# Patient Record
Sex: FEMALE | Race: WHITE | NOT HISPANIC OR LATINO | Employment: FULL TIME | URBAN - METROPOLITAN AREA
[De-identification: names, ages, dates, MRNs, and addresses within clinical notes are randomized per-mention and may not be internally consistent; named-entity substitution may affect disease eponyms.]

---

## 2018-02-08 ENCOUNTER — OFFICE VISIT (OUTPATIENT)
Dept: OBSTETRICS AND GYNECOLOGY | Facility: CLINIC | Age: 28
End: 2018-02-08

## 2018-02-08 VITALS
BODY MASS INDEX: 34.36 KG/M2 | SYSTOLIC BLOOD PRESSURE: 122 MMHG | DIASTOLIC BLOOD PRESSURE: 81 MMHG | HEIGHT: 68 IN | WEIGHT: 226.7 LBS

## 2018-02-08 DIAGNOSIS — Z01.419 PAP SMEAR, LOW-RISK: ICD-10-CM

## 2018-02-08 DIAGNOSIS — L68.0 HIRSUTISM: ICD-10-CM

## 2018-02-08 DIAGNOSIS — Z11.51 SPECIAL SCREENING EXAMINATION FOR HUMAN PAPILLOMAVIRUS (HPV): ICD-10-CM

## 2018-02-08 DIAGNOSIS — Z13.9 SCREENING FOR CONDITION: ICD-10-CM

## 2018-02-08 DIAGNOSIS — N91.3 PRIMARY OLIGOMENORRHEA: ICD-10-CM

## 2018-02-08 DIAGNOSIS — E28.2 PCOS (POLYCYSTIC OVARIAN SYNDROME): ICD-10-CM

## 2018-02-08 DIAGNOSIS — Z01.419 ENCOUNTER FOR GYNECOLOGICAL EXAMINATION WITHOUT ABNORMAL FINDING: Primary | ICD-10-CM

## 2018-02-08 LAB
B-HCG UR QL: NEGATIVE
BILIRUB BLD-MCNC: NEGATIVE MG/DL
CLARITY, POC: CLEAR
COLOR UR: YELLOW
GLUCOSE UR STRIP-MCNC: NEGATIVE MG/DL
INTERNAL NEGATIVE CONTROL: NEGATIVE
INTERNAL POSITIVE CONTROL: POSITIVE
KETONES UR QL: NEGATIVE
LEUKOCYTE EST, POC: NEGATIVE
Lab: NORMAL
NITRITE UR-MCNC: NEGATIVE MG/ML
PH UR: 5 [PH] (ref 5–8)
PROT UR STRIP-MCNC: NEGATIVE MG/DL
RBC # UR STRIP: NEGATIVE /UL
SP GR UR: 1 (ref 1–1.03)
UROBILINOGEN UR QL: NORMAL

## 2018-02-08 PROCEDURE — 99395 PREV VISIT EST AGE 18-39: CPT | Performed by: OBSTETRICS & GYNECOLOGY

## 2018-02-08 PROCEDURE — 81025 URINE PREGNANCY TEST: CPT | Performed by: OBSTETRICS & GYNECOLOGY

## 2018-02-08 PROCEDURE — 99213 OFFICE O/P EST LOW 20 MIN: CPT | Performed by: OBSTETRICS & GYNECOLOGY

## 2018-02-08 RX ORDER — LANCETS 33 GAUGE
EACH MISCELLANEOUS
COMMUNITY
Start: 2018-01-23 | End: 2022-06-08

## 2018-02-08 RX ORDER — BLOOD-GLUCOSE METER
KIT MISCELLANEOUS
COMMUNITY
Start: 2018-01-23 | End: 2022-06-08

## 2018-02-08 RX ORDER — DROSPIRENONE AND ETHINYL ESTRADIOL 0.02-3(28)
1 KIT ORAL DAILY
Qty: 84 TABLET | Refills: 3 | Status: SHIPPED | OUTPATIENT
Start: 2018-02-08 | End: 2022-06-08

## 2018-02-08 NOTE — PROGRESS NOTES
GYN Annual Exam     CC- Here for annual exam.     Oralia Lawrence is a 27 y.o. female who presents for annual well woman exam. Periods are irregular without OCPs. Has skipped 3 months in the past., lasting 5 days. Dysmenorrhea:none. Cyclic symptoms include none. No intermenstrual bleeding, spotting, or discharge.  Patient is sexually active  yes -  . Patient is satisfied with her contraception yes - BTL. Pt with hx of PCOS and is on Metformin. Her primary care physician has her checking her BS for possible DM diagnosis.    OB History     No data available          Current contraception: tubal ligation  History of abnormal Pap smear: no  History of abnormal mammogram: no  Family history of uterine, colon or ovarian cancer: no  Family history of breast cancer: no    Health Maintenance   Topic Date Due   • TDAP/TD VACCINES (1 - Tdap) 10/12/2007   • INFLUENZA VACCINE  2017   • PAP SMEAR  2021       Past Medical History:   Diagnosis Date   • Chronic back pain    • Polycystic ovary syndrome        Past Surgical History:   Procedure Laterality Date   •  SECTION  ,          Current Outpatient Prescriptions:   •  Blood Glucose Monitoring Suppl (ONE TOUCH ULTRA MINI) w/Device kit, , Disp: , Rfl:   •  drospirenone-ethinyl estradiol (JORJE,GIANVI) 3-0.02 MG per tablet, Take 1 tablet by mouth Daily., Disp: 84 tablet, Rfl: 3  •  metFORMIN (GLUCOPHAGE) 500 MG tablet, Take 500 mg by mouth., Disp: , Rfl:   •  naproxen (NAPROSYN) 500 MG tablet, Take 500 mg by mouth 2 (two) times a day with meals., Disp: , Rfl:   •  norethindrone-ethinyl estradiol-iron (ESTROSTEP FE) 1-20/1-30/1-35 MG-MCG tablet, Take 1 tablet by mouth daily., Disp: , Rfl:   •  ONE TOUCH ULTRA TEST test strip, , Disp: , Rfl:   •  ONETOUCH DELICA LANCETS 33G misc, , Disp: , Rfl:     Allergies   Allergen Reactions   • Motrin [Ibuprofen]    • Sulfa Antibiotics        Social History   Substance Use Topics   • Smoking status: Never  "Smoker   • Smokeless tobacco: None   • Alcohol use No       History reviewed. No pertinent family history.    Review of Systems   Constitutional: Negative for activity change, appetite change and unexpected weight change.   Gastrointestinal: Negative for abdominal distention and abdominal pain.   Genitourinary: Negative for menstrual problem, pelvic pain, vaginal bleeding and vaginal discharge.       /81  Ht 172.7 cm (67.99\")  Wt 103 kg (226 lb 11.2 oz)  LMP 02/01/2018 (Exact Date)  Breastfeeding? No  BMI 34.48 kg/m2    Physical Exam   Constitutional: She is oriented to person, place, and time. She appears well-developed and well-nourished.   HENT:   Mouth/Throat: Normal dentition. No dental caries.   Cardiovascular: Normal rate and regular rhythm.    Pulmonary/Chest: Effort normal and breath sounds normal. Right breast exhibits no inverted nipple, no mass, no nipple discharge, no skin change and no tenderness. Left breast exhibits no inverted nipple, no mass, no nipple discharge, no skin change and no tenderness.   Abdominal: Soft. She exhibits no distension and no mass. There is no tenderness.   Genitourinary: There is no rash, tenderness or lesion on the right labia. There is no rash, tenderness or lesion on the left labia. Uterus is not deviated, not enlarged, not fixed and not tender. Cervix exhibits no motion tenderness, no discharge and no friability. Right adnexum displays no mass, no tenderness and no fullness. Left adnexum displays no mass, no tenderness and no fullness. No tenderness or bleeding in the vagina. No vaginal discharge found.   Neurological: She is alert and oriented to person, place, and time.   Psychiatric: She has a normal mood and affect. Her behavior is normal. Judgment and thought content normal.   Vitals reviewed.         Assessment/Plan    1) GYN HM: check pap smear.  SBE demonstrated and encouraged.  2) PCOS: On Metformin 500mg po daily- discussed increasing to bid. Hx of " oligomenorrhea but has been getting cycle more regularly since she has lost some weight.  3) Contraception: BTL  4) Type 2DM? Pt was seen by primary care physician and started on a glucometer. Need lab results to see what labs were drawn by primary care physician.  5) Diet and Exercise discussed. Has lost 12 pounds   6) Smoking Status: nonsmoker  7) Hirsutism: Mostly of face and nipples. Will check testosterone level if it hasnt been checked by primary. Jorje may help some but pt may need Spironolactone.  8) Oligomenorrhea: Start Jorje to regulate cycles and protect endometrium.  8) Follow up prn and 1 year       Diagnoses and all orders for this visit:    Encounter for gynecological examination without abnormal finding    Screening for condition  -     POC Pregnancy, Urine  -     POC Urinalysis Dipstick    PCOS (polycystic ovarian syndrome)    Primary oligomenorrhea    Pap smear, low-risk  -     Pap IG, Rfx HPV ASCU - ThinPrep Vial, Cervix    Special screening examination for human papillomavirus (HPV)  -     Pap IG, Rfx HPV ASCU - ThinPrep Vial, Cervix    Hirsutism    Other orders  -     metFORMIN (GLUCOPHAGE) 500 MG tablet; Take 500 mg by mouth.  -     Blood Glucose Monitoring Suppl (ONE TOUCH ULTRA MINI) w/Device kit;   -     ONE TOUCH ULTRA TEST test strip;   -     ONETOUCH DELICA LANCETS 33G misc;   -     drospirenone-ethinyl estradiol (JORJE,GIANVI) 3-0.02 MG per tablet; Take 1 tablet by mouth Daily.          Monica Turner, DO  2/17/2018  4:25 PM

## 2018-02-12 LAB
CONV .: NORMAL
CYTOLOGIST CVX/VAG CYTO: NORMAL
CYTOLOGY CVX/VAG DOC THIN PREP: NORMAL
DX ICD CODE: NORMAL
HIV 1 & 2 AB SER-IMP: NORMAL
OTHER STN SPEC: NORMAL
PATH REPORT.FINAL DX SPEC: NORMAL
STAT OF ADQ CVX/VAG CYTO-IMP: NORMAL

## 2018-02-17 ENCOUNTER — TELEPHONE (OUTPATIENT)
Dept: OBSTETRICS AND GYNECOLOGY | Facility: CLINIC | Age: 28
End: 2018-02-17

## 2018-02-17 NOTE — TELEPHONE ENCOUNTER
I need the most recent labs that her primary care physician dariana so I know what labs to check on her.

## 2018-02-21 DIAGNOSIS — E28.2 PCOS (POLYCYSTIC OVARIAN SYNDROME): Primary | ICD-10-CM

## 2018-02-26 ENCOUNTER — RESULTS ENCOUNTER (OUTPATIENT)
Dept: OBSTETRICS AND GYNECOLOGY | Facility: CLINIC | Age: 28
End: 2018-02-26

## 2018-02-26 DIAGNOSIS — E28.2 PCOS (POLYCYSTIC OVARIAN SYNDROME): ICD-10-CM

## 2018-02-28 ENCOUNTER — LAB (OUTPATIENT)
Dept: OBSTETRICS AND GYNECOLOGY | Facility: CLINIC | Age: 28
End: 2018-02-28

## 2018-02-28 DIAGNOSIS — E28.2 PCOS (POLYCYSTIC OVARIAN SYNDROME): Primary | ICD-10-CM

## 2018-07-10 ENCOUNTER — HOSPITAL ENCOUNTER (EMERGENCY)
Facility: HOSPITAL | Age: 28
Discharge: LEFT BEFORE DISCHARGE | End: 2018-07-10

## 2018-10-21 ENCOUNTER — OFFICE VISIT (OUTPATIENT)
Dept: RETAIL CLINIC | Facility: CLINIC | Age: 28
End: 2018-10-21

## 2018-10-21 VITALS
DIASTOLIC BLOOD PRESSURE: 88 MMHG | TEMPERATURE: 97.7 F | HEART RATE: 71 BPM | SYSTOLIC BLOOD PRESSURE: 124 MMHG | BODY MASS INDEX: 32.58 KG/M2 | RESPIRATION RATE: 16 BRPM | OXYGEN SATURATION: 98 % | HEIGHT: 68 IN | WEIGHT: 215 LBS

## 2018-10-21 DIAGNOSIS — R05.9 COUGH: ICD-10-CM

## 2018-10-21 DIAGNOSIS — J06.9 ACUTE URI: Primary | ICD-10-CM

## 2018-10-21 PROCEDURE — 99213 OFFICE O/P EST LOW 20 MIN: CPT | Performed by: NURSE PRACTITIONER

## 2018-10-21 RX ORDER — DEXTROMETHORPHAN HYDROBROMIDE AND PROMETHAZINE HYDROCHLORIDE 15; 6.25 MG/5ML; MG/5ML
5 SYRUP ORAL 4 TIMES DAILY PRN
Qty: 118 ML | Refills: 0 | Status: SHIPPED | OUTPATIENT
Start: 2018-10-21 | End: 2018-10-28

## 2018-10-21 RX ORDER — AMOXICILLIN 500 MG/1
500 CAPSULE ORAL 2 TIMES DAILY
Qty: 20 CAPSULE | Refills: 0 | Status: SHIPPED | OUTPATIENT
Start: 2018-10-21 | End: 2018-10-31

## 2018-10-21 NOTE — PROGRESS NOTES
"Subjective   Oralia Lawrence is a 28 y.o. female.     /88 (BP Location: Left arm, Patient Position: Sitting, Cuff Size: Adult)   Pulse 71   Temp 97.7 °F (36.5 °C) (Oral)   Resp 16   Ht 172.7 cm (68\")   Wt 97.5 kg (215 lb)   LMP 10/15/2018   SpO2 98%   BMI 32.69 kg/m²     URI    This is a new problem. The current episode started 1 to 4 weeks ago. The problem has been gradually worsening. There has been no fever. Associated symptoms include congestion, coughing, headaches and rhinorrhea. Pertinent negatives include no ear pain, nausea, rash, sinus pain, sneezing, sore throat, swollen glands, vomiting or wheezing. She has tried nothing (pt was seen at [facility] 2 weeks ago, was given cough med and steroid, but no improvement in condition, pt son is ill at this time ) for the symptoms. The treatment provided no relief.        The following portions of the patient's history were reviewed and updated as appropriate: current medications, past family history, past medical history, past social history, past surgical history and problem list.    Review of Systems   Constitutional: Positive for chills and fatigue. Negative for fever.   HENT: Positive for congestion and rhinorrhea. Negative for ear pain, sneezing and sore throat.    Eyes: Negative.    Respiratory: Positive for cough and choking. Negative for wheezing.    Cardiovascular: Negative.    Gastrointestinal: Negative.  Negative for nausea and vomiting.   Endocrine: Negative.    Genitourinary: Negative.    Skin: Negative for rash.   Allergic/Immunologic: Negative.    Neurological: Positive for headache.   Hematological: Negative.    Psychiatric/Behavioral: Negative.        Objective   Physical Exam   Constitutional: She is oriented to person, place, and time. She appears well-developed and well-nourished.   HENT:   Head: Normocephalic and atraumatic.   Right Ear: Hearing, tympanic membrane, external ear and ear canal normal.   Left Ear: Hearing, tympanic " membrane, external ear and ear canal normal.   Nose: Rhinorrhea and congestion present.   Mouth/Throat: Uvula is midline. Posterior oropharyngeal erythema present.   Eyes: Pupils are equal, round, and reactive to light. Conjunctivae and EOM are normal.   Neck: Normal range of motion.   Cardiovascular: Normal rate, regular rhythm and normal heart sounds.    Pulmonary/Chest: Effort normal and breath sounds normal.   Abdominal: Soft. Bowel sounds are normal.   Musculoskeletal: Normal range of motion.   Neurological: She is alert and oriented to person, place, and time.   Skin: Skin is warm and dry.        Psychiatric: She has a normal mood and affect.         Assessment/Plan   Oralia was seen today for uri.    Diagnoses and all orders for this visit:    Acute URI  -     amoxicillin (AMOXIL) 500 MG capsule; Take 1 capsule by mouth 2 (Two) Times a Day for 10 days.    Cough  -     promethazine-dextromethorphan (PROMETHAZINE-DM) 6.25-15 MG/5ML syrup; Take 5 mL by mouth 4 (Four) Times a Day As Needed for Cough for up to 7 days.

## 2018-10-22 ENCOUNTER — TELEPHONE (OUTPATIENT)
Dept: OBSTETRICS AND GYNECOLOGY | Facility: CLINIC | Age: 28
End: 2018-10-22

## 2018-10-22 DIAGNOSIS — E28.2 PCOS (POLYCYSTIC OVARIAN SYNDROME): Primary | ICD-10-CM

## 2018-10-22 NOTE — TELEPHONE ENCOUNTER
Patient saw you in Feb. She was to come back and have labs drawn. She just got her Insurance back and is wanting to come and get those labs drawn. I saw fasting labs, but not sure what labs are needed. Please advise

## 2018-10-25 LAB
INSULIN SERPL-ACNC: 21.6 UIU/ML (ref 2.6–24.9)
TESTOST FREE SERPL-MCNC: 3.3 PG/ML (ref 0–4.2)
TESTOST SERPL-MCNC: 38 NG/DL (ref 8–48)

## 2022-02-08 ENCOUNTER — HOSPITAL ENCOUNTER (EMERGENCY)
Facility: HOSPITAL | Age: 32
Discharge: HOME | End: 2022-02-08
Attending: EMERGENCY MEDICINE | Admitting: EMERGENCY MEDICINE

## 2022-02-08 ENCOUNTER — APPOINTMENT (OUTPATIENT)
Dept: ULTRASOUND IMAGING | Facility: HOSPITAL | Age: 32
End: 2022-02-08

## 2022-02-08 VITALS
OXYGEN SATURATION: 100 % | WEIGHT: 230 LBS | RESPIRATION RATE: 15 BRPM | HEART RATE: 74 BPM | HEIGHT: 64 IN | BODY MASS INDEX: 39.27 KG/M2 | SYSTOLIC BLOOD PRESSURE: 126 MMHG | TEMPERATURE: 97.7 F | DIASTOLIC BLOOD PRESSURE: 77 MMHG

## 2022-02-08 DIAGNOSIS — M79.605 LEFT LEG PAIN: Primary | ICD-10-CM

## 2022-02-08 PROCEDURE — 99282 EMERGENCY DEPT VISIT SF MDM: CPT | Performed by: EMERGENCY MEDICINE

## 2022-02-08 PROCEDURE — 99282 EMERGENCY DEPT VISIT SF MDM: CPT

## 2022-02-08 PROCEDURE — 93971 EXTREMITY STUDY: CPT

## 2022-02-08 NOTE — ED PROVIDER NOTES
Subjective   History of Present Illness  31-year-old female presents to the emergency room complaining of left leg pain she describes it as intermittent over the last week.  She says it seems to go back and forth between her hamstring area and her calf.  She does not think is been swollen compared to the other side and she has not noticed any redness.  She says it feels like a tightness like it is stretching more than usual.  Has not seen her doctor about this.  Denies any chest pain denies any difficulty breathing.  She denies taking any birth control pills and denies any history of DVT.  No injury to the leg.  Review of Systems   Musculoskeletal:        As noted in HPI       Past Medical History:   Diagnosis Date   • Chronic back pain    • PCOS (polycystic ovarian syndrome)    • Polycystic ovary syndrome        Allergies   Allergen Reactions   • Motrin [Ibuprofen] Hives   • Sulfa Antibiotics Unknown - Low Severity       Past Surgical History:   Procedure Laterality Date   •  SECTION  ,        History reviewed. No pertinent family history.    Social History     Socioeconomic History   • Marital status:    Tobacco Use   • Smoking status: Never Smoker   Substance and Sexual Activity   • Alcohol use: No   • Drug use: No           Objective   Physical Exam  INITIAL VITAL SIGNS: Reviewed by me.  Pulse ox normal  GENERAL: Alert. Well developed and well nourished. No respiratory distress.  RESPIRATORY: Non-labored respirations.  EXTREMITIES: No deformity.  Left leg is not swollen compared to the right leg, left leg has no redness to it.  She says she is mildly tender to palpation of the calf popliteal fossa and hamstring, with stretching the calf and hamstring she says that the pain is essentially reproduced.  SKIN: Warm and dry. No rashes. No diaphoresis.  NEUROLOGIC: Alert. Normal gait    US Venous Doppler Lower Extremity Left (duplex)    Result Date: 2022  VENOUS DOPPLER ULTRASOUND, LEFT  LOWER EXTREMITY, 02/08/2022  HISTORY: 31-year-old female in the ED complaining of left knee and posterior calf pain over the last 2-3 days.  TECHNIQUE: Venous Doppler ultrasound examination of the left leg was performed using grey-scale, spectral Doppler, and color flow Doppler ultrasound imaging.  FINDINGS: The examination is negative.  There is no evidence of deep venous thrombosis from the groin to the lower calf. The greater saphenous vein is also patent.  No cyst or other soft tissue abnormality is visible within the left popliteal space.      Negative examination.  No evidence of left lower extremity DVT.  This report was finalized on 2/8/2022 11:47 AM by Dr. Marcelo Snow MD.        Procedures           ED Course  ED Course as of 02/08/22 1216   Tue Feb 08, 2022   1158 Patient with what seems like muscular discomfort intermittently for the last week.  Says it feels somewhat stretching in nature and is reproduced on examination.  Ultrasound was obtained to rule out DVT which is negative.  I think she be safely discharged home. [RO]      ED Course User Index  [RO] Yakov Duran MD                                                 [facility]    Final diagnoses:   Left leg pain       ED Disposition  ED Disposition     ED Disposition Condition Comment    Discharge Andrea Rodgers MD  [address] [facility] [address] [phone]    Call   To schedule follow-up appointment         Medication List      No changes were made to your prescriptions during this visit.          Yakov Duran MD  02/08/22 1214

## 2022-02-08 NOTE — DISCHARGE INSTRUCTIONS
The ultrasound of your leg looking for blood clot was negative.  Please take Tylenol according to label instructions for discomfort.  Please gently stretch the calf muscle and hamstring muscle.  Please follow-up with your primary care doctor as needed.  Return to the emergency room if you develop chest pain, shortness of breath, severe abdominal pain or for any other concerns.

## 2024-02-17 ENCOUNTER — APPOINTMENT (OUTPATIENT)
Dept: ULTRASOUND IMAGING | Facility: HOSPITAL | Age: 34
End: 2024-02-17
Payer: COMMERCIAL

## 2024-02-17 ENCOUNTER — HOSPITAL ENCOUNTER (EMERGENCY)
Facility: HOSPITAL | Age: 34
Discharge: HOME | End: 2024-02-17
Attending: EMERGENCY MEDICINE
Payer: COMMERCIAL

## 2024-02-17 VITALS
TEMPERATURE: 98.9 F | WEIGHT: 238 LBS | HEIGHT: 67 IN | SYSTOLIC BLOOD PRESSURE: 149 MMHG | HEART RATE: 78 BPM | OXYGEN SATURATION: 97 % | RESPIRATION RATE: 18 BRPM | DIASTOLIC BLOOD PRESSURE: 91 MMHG | BODY MASS INDEX: 37.35 KG/M2

## 2024-02-17 DIAGNOSIS — M79.18 MUSCULOSKELETAL PAIN: Primary | ICD-10-CM

## 2024-02-17 PROCEDURE — 93971 EXTREMITY STUDY: CPT

## 2024-02-17 PROCEDURE — 99284 EMERGENCY DEPT VISIT MOD MDM: CPT

## 2024-02-17 RX ORDER — MELOXICAM 15 MG/1
15 TABLET ORAL DAILY
Qty: 7 TABLET | Refills: 0 | Status: SHIPPED | OUTPATIENT
Start: 2024-02-17

## 2024-02-17 NOTE — ED PROVIDER NOTES
Subjective   History of Present Illness  33-year-old female presents emergency room complaining of left leg pain.  Patient states that approximately 2 days ago she started having some pain in the back of her left mid thigh.  Patient reports no known injury or causative factor.  She states that since that time the pain has migrated now to the back of her left calf.  She is taken some over-the-counter medications with with minimal relief.  Patient reports claudication when she walks and she feels like her leg is mildly swollen.  Patient reports no shortness of breath chest pain neck pain jaw pain headache visual changes fever cough anorexia nausea vomiting dysuria              Review of Systems   Constitutional:  Negative for activity change, appetite change, chills, diaphoresis, fatigue and fever.   HENT:  Negative for congestion, rhinorrhea and sore throat.    Eyes:  Negative for photophobia and visual disturbance.   Respiratory:  Negative for cough and shortness of breath.    Cardiovascular:  Negative for chest pain, palpitations and leg swelling.   Gastrointestinal:  Negative for abdominal distention, abdominal pain, diarrhea, nausea and vomiting.   Genitourinary:  Negative for dysuria and flank pain.   Musculoskeletal:  Negative for arthralgias and back pain.        Left lower leg pain and swelling   Skin:  Negative for rash.   Neurological:  Negative for dizziness, weakness and headaches.   Psychiatric/Behavioral:  Negative for agitation and behavioral problems.        Past Medical History:   Diagnosis Date    Chronic back pain     PCOS (polycystic ovarian syndrome)     Polycystic ovary syndrome        Allergies   Allergen Reactions    Motrin [Ibuprofen] Hives    Sulfa Antibiotics Unknown - Low Severity       Past Surgical History:   Procedure Laterality Date     SECTION  ,        History reviewed. No pertinent family history.    Social History     Socioeconomic History    Marital status:     Tobacco Use    Smoking status: Never    Smokeless tobacco: Never   Vaping Use    Vaping Use: Never used   Substance and Sexual Activity    Alcohol use: No    Drug use: Defer    Sexual activity: Defer           Objective   Physical Exam  Vitals and nursing note reviewed.   Constitutional:       General: She is not in acute distress.     Appearance: Normal appearance. She is not ill-appearing, toxic-appearing or diaphoretic.   HENT:      Head: Normocephalic and atraumatic.      Nose: Nose normal.      Mouth/Throat:      Mouth: Mucous membranes are moist.   Eyes:      Conjunctiva/sclera: Conjunctivae normal.   Cardiovascular:      Rate and Rhythm: Normal rate and regular rhythm.      Pulses: Normal pulses.   Pulmonary:      Effort: Pulmonary effort is normal.      Breath sounds: Normal breath sounds.   Abdominal:      General: Abdomen is flat.   Musculoskeletal:         General: No swelling. Normal range of motion.      Cervical back: Normal range of motion and neck supple.        Legs:       Comments: Mild tenderness to palpation over left calf and area seen on diagram.  Left calf may be slightly larger than right but no pitting edema noted and no distal swelling noted.  Neurovascular intact in left lower extremity.   Skin:     General: Skin is warm and dry.   Neurological:      General: No focal deficit present.      Mental Status: She is alert and oriented to person, place, and time.   Psychiatric:         Mood and Affect: Mood normal.         Procedures           ED Course  ED Course as of 02/17/24 1159   Sat Feb 17, 2024   1139 Ultrasound venous Doppler left lower extremity:  IMPRESSION:  Impression:     1. No acute deep venous thrombosis of the left lower extremity           Electronically Signed: Saman Nicholson MD    2/17/2024 11:35 AM EST    Workstation ID: [id number]   []   1157 Spoke with patient concerning ultrasound results specifically no acute abnormality noted and no DVT noted.  I will treat patient  for musculoskeletal pain with anti-inflammatory medication and have her follow-up with primary care as needed and/or can return the emergency room for new persistent or worsening symptoms.  Patient states she understands agrees with plan. []   1158 Patient states she can take NSAIDs.  Patient states that she can take generic ibuprofen however she cannot take trade name Motrin []   1158 . []      ED Course User Index  [] Omega Giron MD                                             Medical Decision Making  My diagnosis for lower extremity pain and injury includes but is not limited to hip fracture, femur fracture, hip dislocation, hip contusion, hip sprain, hip strain, pelvic fracture, ischio-tibial band pain, ischio-tibial band bursitis, knee sprain, patella dislocation, knee dislocation, internal derangement of knee, fractures of the femur, tibia, fibula, ankle, foot and digits, ankle sprain, ankle dislocation, Lisfranc fracture, fracture dislocations of the digits, pulmonary embolism, claudication, peripheral vascular disease, gout, osteoarthritis, rheumatoid arthritis, bursitis, septic joint, poly-rheumatica, polyarthralgia and other inflammatory or infectious disease processes.         Final diagnoses:   Musculoskeletal pain       ED Disposition  ED Disposition       ED Disposition   Discharge    Condition   Stable    Comment   --               Provider, No Known  [place] [id number]  [phone]    Schedule an appointment as soon as possible for a visit       [facility] EMERGENCY DEPARTMENT  [address] [address] [phone]  Go to   As needed         Medication List        New Prescriptions      meloxicam 15 MG tablet  Commonly known as: MOBIC  Take 1 tablet by mouth Daily.               Where to Get Your Medications        These medications were sent to [id number] - [place] - [address] - [phone]   - [phone]  [address] [id number]      Phone: [phone]   meloxicam 15 MG tablet            Omega Giron MD  02/17/24 9322

## 2024-03-12 ENCOUNTER — OFFICE VISIT (OUTPATIENT)
Dept: INTERNAL MEDICINE | Facility: CLINIC | Age: 34
End: 2024-03-12
Payer: COMMERCIAL

## 2024-03-12 VITALS
SYSTOLIC BLOOD PRESSURE: 162 MMHG | BODY MASS INDEX: 37.48 KG/M2 | DIASTOLIC BLOOD PRESSURE: 108 MMHG | HEIGHT: 67 IN | HEART RATE: 75 BPM | OXYGEN SATURATION: 98 % | TEMPERATURE: 97.8 F | WEIGHT: 238.8 LBS

## 2024-03-12 DIAGNOSIS — E28.2 PCOS (POLYCYSTIC OVARIAN SYNDROME): Primary | ICD-10-CM

## 2024-03-12 DIAGNOSIS — Z13.1 SCREENING FOR DIABETES MELLITUS: ICD-10-CM

## 2024-03-12 DIAGNOSIS — E66.9 CLASS 2 OBESITY WITHOUT SERIOUS COMORBIDITY WITH BODY MASS INDEX (BMI) OF 37.0 TO 37.9 IN ADULT, UNSPECIFIED OBESITY TYPE: ICD-10-CM

## 2024-03-12 DIAGNOSIS — L68.0 HIRSUTISM: ICD-10-CM

## 2024-03-12 DIAGNOSIS — I10 PRIMARY HYPERTENSION: ICD-10-CM

## 2024-03-12 DIAGNOSIS — Z13.220 SCREENING FOR HYPERLIPIDEMIA: ICD-10-CM

## 2024-03-12 RX ORDER — AMLODIPINE BESYLATE 5 MG/1
5 TABLET ORAL DAILY
Qty: 90 TABLET | Refills: 0 | Status: SHIPPED | OUTPATIENT
Start: 2024-03-12

## 2024-03-12 RX ORDER — SPIRONOLACTONE 50 MG/1
50 TABLET, FILM COATED ORAL DAILY
Qty: 90 TABLET | Refills: 1 | Status: SHIPPED | OUTPATIENT
Start: 2024-03-12

## 2024-03-12 NOTE — PROGRESS NOTES
"Chief Complaint  Establish Care, Polycystic Ovary Syndrome, and Stress (Has moments where she feels like her heart is beating out of her chest)    Subjective        Oralia Lawrence presents to [facility]  History of Present Illness    Ms. Lawrence is a rebecca 32 yo F who presents to establish care and discuss HTN, PCOS.     Most bothered by hair growth and weight concerns with PCOS.  Period is now regular (it was not previously).       Objective   Vital Signs:  BP (!) 162/108 (BP Location: Left arm, Patient Position: Sitting, Cuff Size: Large Adult)   Pulse 75   Temp 97.8 °F (36.6 °C) (Infrared)   Ht 170.2 cm (67.01\")   Wt 108 kg (238 lb 12.8 oz)   SpO2 98%   BMI 37.39 kg/m²   Estimated body mass index is 37.39 kg/m² as calculated from the following:    Height as of this encounter: 170.2 cm (67.01\").    Weight as of this encounter: 108 kg (238 lb 12.8 oz).       Class 2 Severe Obesity (BMI >=35 and <=39.9). Obesity-related health conditions include the following: none. Obesity is unchanged. BMI is is above average; BMI management plan is completed. We discussed portion control and increasing exercise.      Physical Exam  Vitals reviewed.   Constitutional:       General: She is not in acute distress.     Appearance: Normal appearance.   HENT:      Head: Normocephalic and atraumatic.      Nose: Nose normal.      Mouth/Throat:      Mouth: Mucous membranes are moist.   Eyes:      Conjunctiva/sclera: Conjunctivae normal.   Cardiovascular:      Rate and Rhythm: Normal rate and regular rhythm.      Pulses: Normal pulses.      Heart sounds: Normal heart sounds.   Pulmonary:      Effort: Pulmonary effort is normal.      Breath sounds: Normal breath sounds.   Abdominal:      Palpations: Abdomen is soft.      Tenderness: There is no abdominal tenderness.   Musculoskeletal:      Right lower leg: No edema.      Left lower leg: No edema.   Skin:     General: Skin is warm and dry. "   Neurological:      General: No focal deficit present.      Mental Status: She is alert.   Psychiatric:         Mood and Affect: Mood normal.      Result Review :    The following data was reviewed by: Stephanie Pierce MD on 03/12/2024:  Common labs          2/18/2024    13:29 2/22/2024    11:06   Common Labs   WBC 7.06     5.43       Hemoglobin 16.0     15.2       Hematocrit 45.6     43.1       Platelets 221     195          Details          This result is from an external source.             Data reviewed : reviewed labs available in system             Assessment and Plan     Diagnoses and all orders for this visit:    1. PCOS (polycystic ovarian syndrome) (Primary)  -     Comprehensive Metabolic Panel  -     Lipid Panel With LDL / HDL Ratio  -     Hemoglobin A1c  -     CBC & Differential  -     T4, Free  -     TSH  -     spironolactone (Aldactone) 50 MG tablet; Take 1 tablet by mouth Daily.  Dispense: 90 tablet; Refill: 1    2. Hirsutism    3. Primary hypertension  -     Comprehensive Metabolic Panel  -     Lipid Panel With LDL / HDL Ratio  -     Hemoglobin A1c  -     CBC & Differential  -     T4, Free  -     TSH  -     spironolactone (Aldactone) 50 MG tablet; Take 1 tablet by mouth Daily.  Dispense: 90 tablet; Refill: 1  -     amLODIPine (NORVASC) 5 MG tablet; Take 1 tablet by mouth Daily.  Dispense: 90 tablet; Refill: 0    4. Class 2 obesity without serious comorbidity with body mass index (BMI) of 37.0 to 37.9 in adult, unspecified obesity type  -     Comprehensive Metabolic Panel  -     Lipid Panel With LDL / HDL Ratio  -     Hemoglobin A1c  -     T4, Free  -     TSH    5. Screening for hyperlipidemia  -     Lipid Panel With LDL / HDL Ratio    6. Screening for diabetes mellitus  -     Hemoglobin A1c        Previously seen at [facility].  Get lab baseline and use spironolactone for hirsutism that bothers her.     Start Amlodipine as well as she has consistent history of HTN and no good way to check BP  at home.  Tubes are tied in terms of using anti-HTN.     Discussed cutting back on some habits before we see each other.  Decided to cut back on sweet tea before we see each other next.          Follow Up     Return in about 6 weeks (around 4/23/2024) for Annual physical.  Patient was given instructions and counseling regarding her condition or for health maintenance advice. Please see specific information pulled into the AVS if appropriate.

## 2024-03-13 LAB
ALBUMIN SERPL-MCNC: 4.2 G/DL (ref 3.5–5.2)
ALBUMIN/GLOB SERPL: 1.5 G/DL
ALP SERPL-CCNC: 71 U/L (ref 39–117)
ALT SERPL-CCNC: 51 U/L (ref 1–33)
AST SERPL-CCNC: 31 U/L (ref 1–32)
BASOPHILS # BLD AUTO: 0.01 10*3/MM3 (ref 0–0.2)
BASOPHILS NFR BLD AUTO: 0.2 % (ref 0–1.5)
BILIRUB SERPL-MCNC: 0.5 MG/DL (ref 0–1.2)
BUN SERPL-MCNC: 8 MG/DL (ref 6–20)
BUN/CREAT SERPL: 8.1 (ref 7–25)
CALCIUM SERPL-MCNC: 9.3 MG/DL (ref 8.6–10.5)
CHLORIDE SERPL-SCNC: 104 MMOL/L (ref 98–107)
CHOLEST SERPL-MCNC: 195 MG/DL (ref 0–200)
CO2 SERPL-SCNC: 25.1 MMOL/L (ref 22–29)
CREAT SERPL-MCNC: 0.99 MG/DL (ref 0.57–1)
EGFRCR SERPLBLD CKD-EPI 2021: 76.4 ML/MIN/1.73
EOSINOPHIL # BLD AUTO: 0.1 10*3/MM3 (ref 0–0.4)
EOSINOPHIL NFR BLD AUTO: 2.1 % (ref 0.3–6.2)
ERYTHROCYTE [DISTWIDTH] IN BLOOD BY AUTOMATED COUNT: 12.9 % (ref 12.3–15.4)
GLOBULIN SER CALC-MCNC: 2.8 GM/DL
GLUCOSE SERPL-MCNC: 88 MG/DL (ref 65–99)
HBA1C MFR BLD: 5 % (ref 4.8–5.6)
HCT VFR BLD AUTO: 43.4 % (ref 34–46.6)
HDLC SERPL-MCNC: 42 MG/DL (ref 40–60)
HGB BLD-MCNC: 15.2 G/DL (ref 12–15.9)
IMM GRANULOCYTES # BLD AUTO: 0.01 10*3/MM3 (ref 0–0.05)
IMM GRANULOCYTES NFR BLD AUTO: 0.2 % (ref 0–0.5)
LDLC SERPL CALC-MCNC: 130 MG/DL (ref 0–100)
LDLC/HDLC SERPL: 3.02 {RATIO}
LYMPHOCYTES # BLD AUTO: 2.11 10*3/MM3 (ref 0.7–3.1)
LYMPHOCYTES NFR BLD AUTO: 44 % (ref 19.6–45.3)
MCH RBC QN AUTO: 31.7 PG (ref 26.6–33)
MCHC RBC AUTO-ENTMCNC: 35 G/DL (ref 31.5–35.7)
MCV RBC AUTO: 90.4 FL (ref 79–97)
MONOCYTES # BLD AUTO: 0.46 10*3/MM3 (ref 0.1–0.9)
MONOCYTES NFR BLD AUTO: 9.6 % (ref 5–12)
NEUTROPHILS # BLD AUTO: 2.11 10*3/MM3 (ref 1.7–7)
NEUTROPHILS NFR BLD AUTO: 43.9 % (ref 42.7–76)
NRBC BLD AUTO-RTO: 0 /100 WBC (ref 0–0.2)
PLATELET # BLD AUTO: 224 10*3/MM3 (ref 140–450)
POTASSIUM SERPL-SCNC: 4 MMOL/L (ref 3.5–5.2)
PROT SERPL-MCNC: 7 G/DL (ref 6–8.5)
RBC # BLD AUTO: 4.8 10*6/MM3 (ref 3.77–5.28)
SODIUM SERPL-SCNC: 140 MMOL/L (ref 136–145)
T4 FREE SERPL-MCNC: 1.23 NG/DL (ref 0.93–1.7)
TRIGL SERPL-MCNC: 130 MG/DL (ref 0–150)
TSH SERPL DL<=0.005 MIU/L-ACNC: 2.77 UIU/ML (ref 0.27–4.2)
VLDLC SERPL CALC-MCNC: 23 MG/DL (ref 5–40)
WBC # BLD AUTO: 4.8 10*3/MM3 (ref 3.4–10.8)

## 2024-04-25 ENCOUNTER — OFFICE VISIT (OUTPATIENT)
Dept: INTERNAL MEDICINE | Facility: CLINIC | Age: 34
End: 2024-04-25
Payer: COMMERCIAL

## 2024-04-25 VITALS
HEIGHT: 67 IN | TEMPERATURE: 98.2 F | OXYGEN SATURATION: 98 % | BODY MASS INDEX: 37.7 KG/M2 | WEIGHT: 240.2 LBS | SYSTOLIC BLOOD PRESSURE: 140 MMHG | HEART RATE: 66 BPM | DIASTOLIC BLOOD PRESSURE: 82 MMHG

## 2024-04-25 DIAGNOSIS — B96.89 BACTERIAL VAGINOSIS: ICD-10-CM

## 2024-04-25 DIAGNOSIS — N89.8 VAGINAL DISCHARGE: ICD-10-CM

## 2024-04-25 DIAGNOSIS — N89.8 VAGINAL ODOR: ICD-10-CM

## 2024-04-25 DIAGNOSIS — Z12.4 SCREENING FOR CERVICAL CANCER: ICD-10-CM

## 2024-04-25 DIAGNOSIS — Z00.00 ANNUAL PHYSICAL EXAM: Primary | ICD-10-CM

## 2024-04-25 DIAGNOSIS — N76.0 BACTERIAL VAGINOSIS: ICD-10-CM

## 2024-04-25 DIAGNOSIS — Z23 NEED FOR VACCINATION: ICD-10-CM

## 2024-04-25 PROCEDURE — 1159F MED LIST DOCD IN RCRD: CPT | Performed by: INTERNAL MEDICINE

## 2024-04-25 PROCEDURE — 90715 TDAP VACCINE 7 YRS/> IM: CPT | Performed by: INTERNAL MEDICINE

## 2024-04-25 PROCEDURE — 1160F RVW MEDS BY RX/DR IN RCRD: CPT | Performed by: INTERNAL MEDICINE

## 2024-04-25 PROCEDURE — 3079F DIAST BP 80-89 MM HG: CPT | Performed by: INTERNAL MEDICINE

## 2024-04-25 PROCEDURE — 90471 IMMUNIZATION ADMIN: CPT | Performed by: INTERNAL MEDICINE

## 2024-04-25 PROCEDURE — 99395 PREV VISIT EST AGE 18-39: CPT | Performed by: INTERNAL MEDICINE

## 2024-04-25 PROCEDURE — 3077F SYST BP >= 140 MM HG: CPT | Performed by: INTERNAL MEDICINE

## 2024-04-25 PROCEDURE — 2014F MENTAL STATUS ASSESS: CPT | Performed by: INTERNAL MEDICINE

## 2024-04-25 NOTE — PROGRESS NOTES
"Chief Complaint  Gynecologic Exam and Skin tag removal    Subjective        Oralia Lawrence presents to [facility]  History of Present Illness    Gyn exam today    Objective   Vital Signs:  /82 (BP Location: Left arm, Patient Position: Sitting, Cuff Size: Large Adult)   Pulse 66   Temp 98.2 °F (36.8 °C) (Infrared)   Ht 170.2 cm (67.01\")   Wt 109 kg (240 lb 3.2 oz)   SpO2 98%   BMI 37.61 kg/m²   Estimated body mass index is 37.61 kg/m² as calculated from the following:    Height as of this encounter: 170.2 cm (67.01\").    Weight as of this encounter: 109 kg (240 lb 3.2 oz).               Physical Exam  Vitals reviewed.   Constitutional:       General: She is not in acute distress.     Appearance: Normal appearance.   HENT:      Head: Normocephalic and atraumatic.      Nose: Nose normal.      Mouth/Throat:      Mouth: Mucous membranes are moist.   Eyes:      Conjunctiva/sclera: Conjunctivae normal.   Pulmonary:      Effort: Pulmonary effort is normal.   Genitourinary:     General: Normal vulva.      Vagina: Vaginal discharge present.   Skin:     General: Skin is warm and dry.   Neurological:      General: No focal deficit present.      Mental Status: She is alert.   Psychiatric:         Mood and Affect: Mood normal.        Result Review :    The following data was reviewed by: Stephanie Pierce MD on 04/25/2024:  Common labs          2/18/2024    13:29 2/22/2024    11:06 3/12/2024    11:26   Common Labs   Glucose   88    BUN   8    Creatinine   0.99    Sodium   140    Potassium   4.0    Chloride   104    Calcium   9.3    Total Protein   7.0    Albumin   4.2    Total Bilirubin   0.5    Alkaline Phosphatase   71    AST (SGOT)   31    ALT (SGPT)   51    WBC 7.06     5.43     4.80    Hemoglobin 16.0     15.2     15.2    Hematocrit 45.6     43.1     43.4    Platelets 221     195     224    Total Cholesterol   195    Triglycerides   130    HDL Cholesterol   42    LDL Cholesterol    " 130    Hemoglobin A1C   5.00       Details          This result is from an external source.             Data reviewed : no new data available for review             Assessment and Plan     Diagnoses and all orders for this visit:    1. Annual physical exam (Primary)    2. Need for vaccination  -     Tdap Vaccine => 6yo IM (BOOSTRIX)    3. Screening for cervical cancer  -     IGP, Aptima HPV, Rfx 16 / 18,45    4. Vaginal discharge  -     NuSwab VG+ - ThinPrep Vial, Cervix    5. Vaginal odor  -     NuSwab VG+ - ThinPrep Vial, Cervix      Reviewed all pertinent vaccines for age and discussed healthy weight, exercise.  Discussed ways to improve ASCVD health and general mental/physical health.     Will check testing and then decide about skin tag removal         Follow Up     Return in about 6 months (around 10/25/2024) for f/u skin tags.  Patient was given instructions and counseling regarding her condition or for health maintenance advice. Please see specific information pulled into the AVS if appropriate.

## 2024-04-27 LAB
A VAGINAE DNA VAG QL NAA+PROBE: ABNORMAL SCORE
BVAB2 DNA VAG QL NAA+PROBE: ABNORMAL SCORE
C ALBICANS DNA VAG QL NAA+PROBE: NEGATIVE
C GLABRATA DNA VAG QL NAA+PROBE: NEGATIVE
C TRACH DNA VAG QL NAA+PROBE: NEGATIVE
MEGA1 DNA VAG QL NAA+PROBE: ABNORMAL SCORE
N GONORRHOEA DNA VAG QL NAA+PROBE: NEGATIVE
T VAGINALIS DNA VAG QL NAA+PROBE: NEGATIVE

## 2024-04-29 LAB
CYTOLOGIST CVX/VAG CYTO: NORMAL
CYTOLOGY CVX/VAG DOC CYTO: NORMAL
CYTOLOGY CVX/VAG DOC THIN PREP: NORMAL
DX ICD CODE: NORMAL
HPV GENOTYPE REFLEX: NORMAL
HPV I/H RISK 4 DNA CVX QL PROBE+SIG AMP: NEGATIVE
Lab: NORMAL
OTHER STN SPEC: NORMAL
STAT OF ADQ CVX/VAG CYTO-IMP: NORMAL

## 2024-04-29 RX ORDER — METRONIDAZOLE 500 MG/1
500 TABLET ORAL 2 TIMES DAILY
Qty: 14 TABLET | Refills: 0 | Status: SHIPPED | OUTPATIENT
Start: 2024-04-29 | End: 2024-05-06

## 2024-06-06 DIAGNOSIS — I10 PRIMARY HYPERTENSION: ICD-10-CM

## 2024-06-06 RX ORDER — AMLODIPINE BESYLATE 5 MG/1
5 TABLET ORAL DAILY
Qty: 90 TABLET | Refills: 3 | Status: SHIPPED | OUTPATIENT
Start: 2024-06-06

## 2024-09-05 DIAGNOSIS — I10 PRIMARY HYPERTENSION: ICD-10-CM

## 2024-09-05 DIAGNOSIS — E28.2 PCOS (POLYCYSTIC OVARIAN SYNDROME): ICD-10-CM

## 2024-09-05 RX ORDER — SPIRONOLACTONE 50 MG/1
50 TABLET, FILM COATED ORAL DAILY
Qty: 90 TABLET | Refills: 1 | Status: SHIPPED | OUTPATIENT
Start: 2024-09-05

## 2024-09-05 NOTE — TELEPHONE ENCOUNTER
Rx Refill Note  Requested Prescriptions     Pending Prescriptions Disp Refills    spironolactone (ALDACTONE) 50 MG tablet [Pharmacy Med Name: SPIRONOLACTONE 50 MG TABLET] 90 tablet 1     Sig: Take 1 tablet by mouth Daily.      Last office visit with prescribing clinician: 4/25/2024   Last telemedicine visit with prescribing clinician: Visit date not found   Next office visit with prescribing clinician: 10/28/2024                         Would you like a call back once the refill request has been completed: [] Yes [] No    If the office needs to give you a call back, can they leave a voicemail: [] Yes [] No    Herminia Nieves MA  09/05/24, 10:02 EDT

## 2024-10-28 ENCOUNTER — OFFICE VISIT (OUTPATIENT)
Dept: INTERNAL MEDICINE | Facility: CLINIC | Age: 34
End: 2024-10-28
Payer: COMMERCIAL

## 2024-10-28 VITALS
OXYGEN SATURATION: 99 % | HEART RATE: 80 BPM | BODY MASS INDEX: 37.26 KG/M2 | WEIGHT: 238 LBS | SYSTOLIC BLOOD PRESSURE: 130 MMHG | TEMPERATURE: 98.2 F | DIASTOLIC BLOOD PRESSURE: 88 MMHG

## 2024-10-28 DIAGNOSIS — E28.2 PCOS (POLYCYSTIC OVARIAN SYNDROME): ICD-10-CM

## 2024-10-28 DIAGNOSIS — N89.8 SKIN TAG OF VAGINAL MUCOSA: Primary | ICD-10-CM

## 2024-10-28 DIAGNOSIS — E66.01 CLASS 2 SEVERE OBESITY WITH SERIOUS COMORBIDITY AND BODY MASS INDEX (BMI) OF 37.0 TO 37.9 IN ADULT, UNSPECIFIED OBESITY TYPE: ICD-10-CM

## 2024-10-28 DIAGNOSIS — E66.812 CLASS 2 SEVERE OBESITY WITH SERIOUS COMORBIDITY AND BODY MASS INDEX (BMI) OF 37.0 TO 37.9 IN ADULT, UNSPECIFIED OBESITY TYPE: ICD-10-CM

## 2024-10-28 PROCEDURE — 1126F AMNT PAIN NOTED NONE PRSNT: CPT | Performed by: INTERNAL MEDICINE

## 2024-10-28 PROCEDURE — 3079F DIAST BP 80-89 MM HG: CPT | Performed by: INTERNAL MEDICINE

## 2024-10-28 PROCEDURE — 99214 OFFICE O/P EST MOD 30 MIN: CPT | Performed by: INTERNAL MEDICINE

## 2024-10-28 PROCEDURE — 3075F SYST BP GE 130 - 139MM HG: CPT | Performed by: INTERNAL MEDICINE

## 2024-10-28 PROCEDURE — 1159F MED LIST DOCD IN RCRD: CPT | Performed by: INTERNAL MEDICINE

## 2024-10-28 PROCEDURE — 1160F RVW MEDS BY RX/DR IN RCRD: CPT | Performed by: INTERNAL MEDICINE

## 2024-10-28 NOTE — PROGRESS NOTES
Oralia Lawrence is a 34 y.o. female who presents with a chief complaint of   Chief Complaint   Patient presents with    Skin Problem     6 month f/u       Skin Problem         Skin tags still present.  No changes to the way that these appear.  No bleeding or other symptoms.       The following portions of the patient's history were reviewed and updated as appropriate: allergies, current medications, past family history, past medical history, past social history, past surgical history and problem list.      Current Outpatient Medications:     amLODIPine (NORVASC) 5 MG tablet, TAKE 1 TABLET BY MOUTH DAILY, Disp: 90 tablet, Rfl: 3    spironolactone (ALDACTONE) 50 MG tablet, TAKE 1 TABLET BY MOUTH DAILY, Disp: 90 tablet, Rfl: 1    tiZANidine (ZANAFLEX) 4 MG tablet, Take 1 tablet by mouth Every 8 (Eight) Hours As Needed for Muscle Spasms., Disp: 30 tablet, Rfl: 0    meloxicam (MOBIC) 15 MG tablet, Take 1 tablet by mouth Daily. (Patient not taking: Reported on 10/28/2024), Disp: 7 tablet, Rfl: 0    naltrexone-bupropion ER (CONTRAVE) 8-90 MG tablet, Wk 1: 1 tab daily, Wk 2: 1 tab twice a day, Wk 3: 2 tabs in AM, 1 tab in PM, Wk 4: 2 tabs twice a day, Maintenance dose: 2 tabs twice daily., Disp: 120 tablet, Rfl: 0            Physical Exam  /88 (BP Location: Right arm, Patient Position: Sitting, Cuff Size: Adult)   Pulse 80   Temp 98.2 °F (36.8 °C) (Infrared)   Wt 108 kg (238 lb)   SpO2 99%   BMI 37.26 kg/m²     Physical Exam  Vitals reviewed.   Constitutional:       General: She is not in acute distress.     Appearance: Normal appearance.   HENT:      Head: Normocephalic and atraumatic.      Nose: Nose normal.      Mouth/Throat:      Mouth: Mucous membranes are moist.   Eyes:      Conjunctiva/sclera: Conjunctivae normal.   Pulmonary:      Effort: Pulmonary effort is normal.   Skin:     General: Skin is warm and dry.   Neurological:      General: No focal deficit present.      Mental Status: She is alert.    Psychiatric:         Mood and Affect: Mood normal.           Results for orders placed or performed in visit on 04/25/24   Nuab VG+ - ,    Collection Time: 04/25/24 11:43 AM    CX   Result Value Ref Range    Atopobium Vaginae Moderate - 1 Score    BVAB 2 High - 2 (A) Score    Megasphaera 1 High - 2 (A) Score    Nita Albicans, MELISSA Negative Negative    Candida Glabrata, MELISSA Negative Negative    Trichomonas vaginosis Negative Negative    Chlamydia trachomatis, MELISSA Negative Negative    Neisseria gonorrhoeae, MELISSA Negative Negative           Diagnoses and all orders for this visit:    1. Skin tag of vaginal mucosa (Primary)  -     Ambulatory Referral to Obstetrics / Gynecology    2. PCOS (polycystic ovarian syndrome)  -     naltrexone-bupropion ER (CONTRAVE) 8-90 MG tablet; Wk 1: 1 tab daily, Wk 2: 1 tab twice a day, Wk 3: 2 tabs in AM, 1 tab in PM, Wk 4: 2 tabs twice a day, Maintenance dose: 2 tabs twice daily.  Dispense: 120 tablet; Refill: 0    3. Class 2 severe obesity with serious comorbidity and body mass index (BMI) of 37.0 to 37.9 in adult, unspecified obesity type  -     naltrexone-bupropion ER (CONTRAVE) 8-90 MG tablet; Wk 1: 1 tab daily, Wk 2: 1 tab twice a day, Wk 3: 2 tabs in AM, 1 tab in PM, Wk 4: 2 tabs twice a day, Maintenance dose: 2 tabs twice daily.  Dispense: 120 tablet; Refill: 0

## 2025-02-26 ENCOUNTER — OFFICE VISIT (OUTPATIENT)
Dept: INTERNAL MEDICINE | Facility: CLINIC | Age: 35
End: 2025-02-26
Payer: COMMERCIAL

## 2025-02-26 VITALS
BODY MASS INDEX: 38.89 KG/M2 | SYSTOLIC BLOOD PRESSURE: 132 MMHG | OXYGEN SATURATION: 98 % | RESPIRATION RATE: 18 BRPM | TEMPERATURE: 98.3 F | HEART RATE: 83 BPM | HEIGHT: 67 IN | WEIGHT: 247.8 LBS | DIASTOLIC BLOOD PRESSURE: 92 MMHG

## 2025-02-26 DIAGNOSIS — Z87.828 HISTORY OF TRAUMA: ICD-10-CM

## 2025-02-26 DIAGNOSIS — E78.00 PURE HYPERCHOLESTEROLEMIA: Primary | ICD-10-CM

## 2025-02-26 DIAGNOSIS — E66.812 CLASS 2 SEVERE OBESITY WITH SERIOUS COMORBIDITY AND BODY MASS INDEX (BMI) OF 37.0 TO 37.9 IN ADULT, UNSPECIFIED OBESITY TYPE: ICD-10-CM

## 2025-02-26 DIAGNOSIS — E28.2 PCOS (POLYCYSTIC OVARIAN SYNDROME): ICD-10-CM

## 2025-02-26 DIAGNOSIS — E66.01 CLASS 2 SEVERE OBESITY WITH SERIOUS COMORBIDITY AND BODY MASS INDEX (BMI) OF 37.0 TO 37.9 IN ADULT, UNSPECIFIED OBESITY TYPE: ICD-10-CM

## 2025-02-26 LAB
ALBUMIN SERPL-MCNC: 3.9 G/DL (ref 3.5–5.2)
ALBUMIN/GLOB SERPL: 1.1 G/DL
ALP SERPL-CCNC: 75 U/L (ref 39–117)
ALT SERPL-CCNC: 36 U/L (ref 1–33)
AST SERPL-CCNC: 24 U/L (ref 1–32)
BILIRUB SERPL-MCNC: 0.4 MG/DL (ref 0–1.2)
BUN SERPL-MCNC: 11 MG/DL (ref 6–20)
BUN/CREAT SERPL: 13.4 (ref 7–25)
CALCIUM SERPL-MCNC: 9.4 MG/DL (ref 8.6–10.5)
CHLORIDE SERPL-SCNC: 104 MMOL/L (ref 98–107)
CHOLEST SERPL-MCNC: 172 MG/DL (ref 0–200)
CO2 SERPL-SCNC: 22.9 MMOL/L (ref 22–29)
CREAT SERPL-MCNC: 0.82 MG/DL (ref 0.57–1)
EGFRCR SERPLBLD CKD-EPI 2021: 95.2 ML/MIN/1.73
ERYTHROCYTE [DISTWIDTH] IN BLOOD BY AUTOMATED COUNT: 12 % (ref 12.3–15.4)
GLOBULIN SER CALC-MCNC: 3.4 GM/DL
GLUCOSE SERPL-MCNC: 98 MG/DL (ref 65–99)
HBA1C MFR BLD: 5 % (ref 4.8–5.6)
HCT VFR BLD AUTO: 43.5 % (ref 34–46.6)
HDLC SERPL-MCNC: 41 MG/DL (ref 40–60)
HGB BLD-MCNC: 14.9 G/DL (ref 12–15.9)
LDLC SERPL CALC-MCNC: 113 MG/DL (ref 0–100)
LDLC/HDLC SERPL: 2.72 {RATIO}
MCH RBC QN AUTO: 31.6 PG (ref 26.6–33)
MCHC RBC AUTO-ENTMCNC: 34.3 G/DL (ref 31.5–35.7)
MCV RBC AUTO: 92.4 FL (ref 79–97)
PLATELET # BLD AUTO: 243 10*3/MM3 (ref 140–450)
POTASSIUM SERPL-SCNC: 4.4 MMOL/L (ref 3.5–5.2)
PROT SERPL-MCNC: 7.3 G/DL (ref 6–8.5)
RBC # BLD AUTO: 4.71 10*6/MM3 (ref 3.77–5.28)
SODIUM SERPL-SCNC: 136 MMOL/L (ref 136–145)
TRIGL SERPL-MCNC: 97 MG/DL (ref 0–150)
VLDLC SERPL CALC-MCNC: 18 MG/DL (ref 5–40)
WBC # BLD AUTO: 5.2 10*3/MM3 (ref 3.4–10.8)

## 2025-02-26 PROCEDURE — 1159F MED LIST DOCD IN RCRD: CPT | Performed by: INTERNAL MEDICINE

## 2025-02-26 PROCEDURE — 3075F SYST BP GE 130 - 139MM HG: CPT | Performed by: INTERNAL MEDICINE

## 2025-02-26 PROCEDURE — 1126F AMNT PAIN NOTED NONE PRSNT: CPT | Performed by: INTERNAL MEDICINE

## 2025-02-26 PROCEDURE — 3080F DIAST BP >= 90 MM HG: CPT | Performed by: INTERNAL MEDICINE

## 2025-02-26 PROCEDURE — 1160F RVW MEDS BY RX/DR IN RCRD: CPT | Performed by: INTERNAL MEDICINE

## 2025-02-26 PROCEDURE — 99214 OFFICE O/P EST MOD 30 MIN: CPT | Performed by: INTERNAL MEDICINE

## 2025-02-26 NOTE — PROGRESS NOTES
"Chief Complaint  Hypertension (Not monitoring at home, no symptoms) and Obesity (Weight increase as of today - would like to get to 180lbs)    Subjective        Oralia Lawrence presents to [facility]  Hypertension    Obesity    Unable to get Contrave      Objective   Vital Signs:  /92 (BP Location: Left arm, Patient Position: Sitting, Cuff Size: Large Adult) Comment: no BP meds yet  Pulse 83   Temp 98.3 °F (36.8 °C) (Infrared)   Resp 18   Ht 170.2 cm (67.01\")   Wt 112 kg (247 lb 12.8 oz)   SpO2 98%   BMI 38.80 kg/m²   Estimated body mass index is 38.8 kg/m² as calculated from the following:    Height as of this encounter: 170.2 cm (67.01\").    Weight as of this encounter: 112 kg (247 lb 12.8 oz).            Physical Exam  Vitals reviewed.   Constitutional:       General: She is not in acute distress.     Appearance: Normal appearance.   HENT:      Head: Normocephalic and atraumatic.      Nose: Nose normal.      Mouth/Throat:      Mouth: Mucous membranes are moist.   Eyes:      Conjunctiva/sclera: Conjunctivae normal.   Pulmonary:      Effort: Pulmonary effort is normal.   Skin:     General: Skin is warm and dry.   Neurological:      General: No focal deficit present.      Mental Status: She is alert.   Psychiatric:         Mood and Affect: Mood normal.        Result Review :         Assessment and Plan   Diagnoses and all orders for this visit:    1. Pure hypercholesterolemia (Primary)  -     Lipid Panel With LDL / HDL Ratio  -     Hemoglobin A1c    2. PCOS (polycystic ovarian syndrome)  -     CBC (No Diff)  -     Comprehensive Metabolic Panel  -     Hemoglobin A1c  -     naltrexone-bupropion ER (CONTRAVE) 8-90 MG tablet; Wk 1: 1 tab daily, Wk 2: 1 tab twice a day, Wk 3: 2 tabs in AM, 1 tab in PM, Wk 4: 2 tabs twice a day, Maintenance dose: 2 tabs twice daily.  Dispense: 120 tablet; Refill: 0    3. Class 2 severe obesity with serious comorbidity and body mass index " (BMI) of 37.0 to 37.9 in adult, unspecified obesity type  -     Hemoglobin A1c  -     naltrexone-bupropion ER (CONTRAVE) 8-90 MG tablet; Wk 1: 1 tab daily, Wk 2: 1 tab twice a day, Wk 3: 2 tabs in AM, 1 tab in PM, Wk 4: 2 tabs twice a day, Maintenance dose: 2 tabs twice daily.  Dispense: 120 tablet; Refill: 0    4. History of trauma  -     Ambulatory Referral to Behavioral Health      Sent the Contrave through mail order today.  Referred for counseling to try and address some of past trauma. Labs ordered for yearly today.          Follow Up   Return in about 6 months (around 8/26/2025) for Annual physical.  Patient was given instructions and counseling regarding her condition or for health maintenance advice. Please see specific information pulled into the AVS if appropriate.

## 2025-03-04 ENCOUNTER — OFFICE VISIT (OUTPATIENT)
Dept: OBSTETRICS AND GYNECOLOGY | Facility: CLINIC | Age: 35
End: 2025-03-04
Payer: COMMERCIAL

## 2025-03-04 ENCOUNTER — TELEPHONE (OUTPATIENT)
Dept: OBSTETRICS AND GYNECOLOGY | Facility: CLINIC | Age: 35
End: 2025-03-04
Payer: COMMERCIAL

## 2025-03-04 VITALS
WEIGHT: 244 LBS | HEIGHT: 67 IN | SYSTOLIC BLOOD PRESSURE: 190 MMHG | BODY MASS INDEX: 38.3 KG/M2 | DIASTOLIC BLOOD PRESSURE: 134 MMHG

## 2025-03-04 DIAGNOSIS — A63.0 GENITAL WARTS: Primary | ICD-10-CM

## 2025-03-04 DIAGNOSIS — E28.2 PCOS (POLYCYSTIC OVARIAN SYNDROME): ICD-10-CM

## 2025-03-04 DIAGNOSIS — I10 PRIMARY HYPERTENSION: ICD-10-CM

## 2025-03-04 RX ORDER — SPIRONOLACTONE 50 MG/1
50 TABLET, FILM COATED ORAL DAILY
Qty: 90 TABLET | Refills: 1 | Status: SHIPPED | OUTPATIENT
Start: 2025-03-04

## 2025-03-04 NOTE — TELEPHONE ENCOUNTER
Rx Refill Note  Requested Prescriptions     Pending Prescriptions Disp Refills    spironolactone (ALDACTONE) 50 MG tablet [Pharmacy Med Name: SPIRONOLACTONE 50 MG TABLET] 90 tablet 1     Sig: TAKE 1 TABLET BY MOUTH DAILY      Last office visit with prescribing clinician: 2/26/2025   Last telemedicine visit with prescribing clinician: Visit date not found   Next office visit with prescribing clinician: 8/28/2025                         Would you like a call back once the refill request has been completed: [] Yes [] No    If the office needs to give you a call back, can they leave a voicemail: [] Yes [] No    Mina Hickman MA  03/04/25, 11:50 EST

## 2025-03-04 NOTE — PROGRESS NOTES
"Subjective     Chief Complaint   Patient presents with    Vaginal Pain       Oralia Lawrence is a 34 y.o. No obstetric history on file. whose LMP is Patient's last menstrual period was 02/12/2025 (approximate).. She presents as a referral from her PCP for vaginal skin tags. She noticed the areas after cleaning the vaginal area. She reports she has some other random skin tags as well and is asking for this to be evaluated. She denies pain in the area. Reports with [facility] she notices the areas as well.     Her pap smear is UTD- NIL/HPV neg,  with Dr. Pierce 4/24     HPI    HPI    The following portions of the patient's history were reviewed and updated as appropriate:vital signs, allergies, current medications, past medical history, past social history, past surgical history, and problem list      Review of Systems     Review of Systems   Constitutional: Negative.    Genitourinary:         Vaginal skin tags        Objective      BP (!) 190/134   Ht 170.2 cm (67.01\")   Wt 111 kg (244 lb)   LMP 02/12/2025 (Approximate)   Breastfeeding No   BMI 38.21 kg/m²     Physical Exam    Physical Exam  Vitals and nursing note reviewed.   Constitutional:       Appearance: Normal appearance.   Genitourinary:     Labia:         Right: Lesion present. No rash, tenderness or injury.         Left: Lesion present. No rash, tenderness or injury.           Comments: Multiple, large flesh color raised lesion   Musculoskeletal:         General: Normal range of motion.   Skin:     General: Skin is warm and dry.   Neurological:      General: No focal deficit present.      Mental Status: She is alert and oriented to person, place, and time.   Psychiatric:         Mood and Affect: Mood normal.         Behavior: Behavior normal.         Lab Review   Labs: No data reviewed     Imaging   No data reviewed    Assessment  There are no diagnoses linked to this encounter.    Additional Assessment:   Genital warts   Elevated BP     Plan     Genital " warts- Disc HPV lesions. Plan excision in the OR with Dr. Macias. Dr. Macias into exam room to examine patient and disc procedure. Rec HPV vaccine, info provided  Elevated BP- Disc with patient. Pt did not take BP medicine today. Disc serious health risk with elevated BP including but not limited to organ damage and stroke. Pt is going to go home and take her BP med.     Pt taken to [facility] to schedule procedure       Clara Beebe, APRN  3/4/2025

## 2025-03-05 ENCOUNTER — PATIENT ROUNDING (BHMG ONLY) (OUTPATIENT)
Dept: OBSTETRICS AND GYNECOLOGY | Facility: CLINIC | Age: 35
End: 2025-03-05
Payer: COMMERCIAL

## 2025-03-05 NOTE — PROGRESS NOTES
A MY-CHART MESSAGE HAS BEEN SENT TO THE PATIENT FOR PATIENT ROUNDING WITH [facility]

## 2025-03-17 ENCOUNTER — PREP FOR SURGERY (OUTPATIENT)
Dept: OTHER | Facility: HOSPITAL | Age: 35
End: 2025-03-17
Payer: COMMERCIAL

## 2025-03-17 DIAGNOSIS — A63.0 GENITAL WARTS: Primary | ICD-10-CM

## 2025-03-17 RX ORDER — SODIUM CHLORIDE 9 MG/ML
40 INJECTION, SOLUTION INTRAVENOUS AS NEEDED
OUTPATIENT
Start: 2025-03-25

## 2025-03-17 RX ORDER — SODIUM CHLORIDE 0.9 % (FLUSH) 0.9 %
10 SYRINGE (ML) INJECTION EVERY 12 HOURS SCHEDULED
OUTPATIENT
Start: 2025-03-25

## 2025-03-17 RX ORDER — SODIUM CHLORIDE 0.9 % (FLUSH) 0.9 %
10 SYRINGE (ML) INJECTION AS NEEDED
OUTPATIENT
Start: 2025-03-25

## 2025-03-20 ENCOUNTER — PRE-ADMISSION TESTING (OUTPATIENT)
Dept: PREADMISSION TESTING | Facility: HOSPITAL | Age: 35
End: 2025-03-20
Payer: COMMERCIAL

## 2025-03-20 VITALS
HEART RATE: 89 BPM | SYSTOLIC BLOOD PRESSURE: 167 MMHG | HEIGHT: 67 IN | BODY MASS INDEX: 38.31 KG/M2 | OXYGEN SATURATION: 97 % | WEIGHT: 244.1 LBS | RESPIRATION RATE: 18 BRPM | DIASTOLIC BLOOD PRESSURE: 94 MMHG

## 2025-03-20 LAB
HCG SERPL QL: NEGATIVE
QT INTERVAL: 393 MS
QTC INTERVAL: 390 MS

## 2025-03-20 PROCEDURE — 84703 CHORIONIC GONADOTROPIN ASSAY: CPT

## 2025-03-20 PROCEDURE — 93010 ELECTROCARDIOGRAM REPORT: CPT | Performed by: INTERNAL MEDICINE

## 2025-03-20 PROCEDURE — 93005 ELECTROCARDIOGRAM TRACING: CPT

## 2025-03-20 PROCEDURE — 36415 COLL VENOUS BLD VENIPUNCTURE: CPT

## 2025-03-20 NOTE — PAT
Pt here for PAT visit. Patient encouraged to reach out to PCP due to having elevated BP lately despite taking her amlodipine. Pre-op tests completed, chg soap given, and instructions reviewed.  Instructed clears until 2 hrs prior to arrival time, voiced understanding.

## 2025-03-20 NOTE — DISCHARGE INSTRUCTIONS
PRE-ADMISSION TESTING INSTRUCTIONS FOR ADULTS    Take these medications the morning of surgery with a small sip of water: amlodipine    Do not take any insulin or diabetes medications the morning of surgery.  Hold Contrave for 3 days prior to surgery.  No aspirin, advil, aleve, ibuprofen, naproxen, diet pills, decongestants, or herbal/vitamins for a week prior to surgery.       Tylenol/Acetaminophen is okay to take if needed.    General Instructions:    DO NOT EAT SOLID FOOD AFTER MIDNIGHT THE NIGHT BEFORE SURGERY. No gum, mints, or hard candy after midnight the night before surgery.  You may drink clear liquids the day of surgery up until 2 hours before your arrival time.  Clear liquids are liquids you can see through. Nothing RED in color.    Plain water    Sports drinks      Gelatin (Jell-O)  Fruit juices without pulp such as white grape juice and apple juice  Popsicles that contain no fruit or yogurt  Tea or coffee (no cream or milk added)    It is beneficial for you to have a clear drink that contains carbohydrates 2 hours before your arrival time.  We suggest a 20 ounce bottle of Gatorade or Powerade for non-diabetic patients or a 20 ounce bottle of Gatorade Zero or Powerade Zero for diabetic patients.     Patients who avoid smoking, chewing tobacco and alcohol for 4 weeks prior to surgery have a reduced risk of post-operative complications.  If at all possible, quit smoking as many days before surgery as you can.    Do not smoke, use chewing tobacco or drink alcohol the day of surgery    Bring your C-PAP/ BI-PAP machine if you use one.  Wear clean comfortable clothes.  Do not wear contact lenses, lotion, deodorant, or make-up.  Bring a case for your glasses if applicable. You may brush your teeth the morning of surgery.  You may wear dentures/partials, do not put adhesive/glue on them.  Leave all other jewelry and valuables at home.      Preventing a Surgical Site Infection:    Shower the night before and on  back pain/injury the morning of surgery using the chlorhexidine soap you were given.  Use a clean washcloth with the soap.  Place clean sheets on your bed after showering the night before surgery. Do not use the CHG soap on your hair, face, or private areas. Wash your body gently for five (5) minutes. Do not scrub your skin.  Dry with a clean towel and dress in clean clothing.  Do not shave the surgical area for 10 days-2 weeks prior to surgery  because the razor can irritate skin and make it easier to develop an infection.  Make sure you, your family, and all healthcare providers clean their hands with soap and water or an alcohol based hand  before caring for you or your wound.      Day of surgery:    Your surgeon’s office will advise you of your arrival time for the day of surgery.    Upon arrival, a Pre-op nurse and Anesthesia provider will review your health history, obtain vital signs, and answer questions you may have. The anesthesia provider will also discuss the type of anesthesia that will be needed for your procedure, which may include general anesthesia. The only belongings needed at this time will be your home medications and if applicable your C-PAP/BI-PAP machine.  If you are staying overnight your family can leave the rest of your belongings in the car and bring them to your room later.  A Pre-op nurse will start an IV and you may receive medication in preparation for surgery, including something to help you relax.  Your family will be able to see you in the Pre-op area.  While you are in surgery your family should notify the waiting room  if they leave the waiting room area and provide a contact phone number.    IF you have any questions, you can call the Pre-Admission Department at [phone] or your surgeon's office.  Notify your surgeon if  you become sick, have a fever, productive cough, or cannot be here the day of surgery    Please be aware that surgery does come with discomfort.  We  want to make every effort to control your discomfort so please discuss any uncontrolled symptoms with your nurse.   Your doctor will most likely have prescribed pain medications.      If you are going home after surgery, you will receive individualized written care instructions before being discharged.  A responsible adult (over the age of 18) must drive you to and from the hospital on the day of your surgery and stay with you for 24 hours after anesthesia.    If you are staying overnight following surgery, you will be transported to your hospital room following the recovery period.  [facility] has all private rooms.    You may receive a survey regarding the care you received. Your feedback is very important and will be used to collect the necessary data to help us to continue to provide excellent care.     Deductibles and co-payments are collected on the day of service. Please be prepared to pay the required co-pay, deductible or deposit on the day of service as defined by your plan.

## 2025-03-24 ENCOUNTER — ANESTHESIA EVENT (OUTPATIENT)
Dept: PERIOP | Facility: HOSPITAL | Age: 35
End: 2025-03-24
Payer: COMMERCIAL

## 2025-03-24 PROCEDURE — S0260 H&P FOR SURGERY: HCPCS | Performed by: OBSTETRICS & GYNECOLOGY

## 2025-03-24 NOTE — H&P
Patient Care Team:  Stephanie Pierce MD as PCP - General (Internal Medicine)  Andrea Christina MD as PCP - Family Medicine  Clara Beebe APRN as Nurse Practitioner (Obstetrics and Gynecology)    Chief complaint genital warts       HPI:   35 yo est pt here for EUA and removal of extensive vaginal warts.       PMH:   Past Medical History:   Diagnosis Date    Anxiety     Cataract     bilateral    Chronic back pain     GERD (gastroesophageal reflux disease)     Hypertension     Obesity     PCOS (polycystic ovarian syndrome)     Polycystic ovary syndrome     Trauma     It's been all my life         PSH:   Past Surgical History:   Procedure Laterality Date     SECTION  ,     TUBAL ABDOMINAL LIGATION  2014       SoHx:   Social History     Socioeconomic History    Marital status:    Tobacco Use    Smoking status: Never    Smokeless tobacco: Never   Vaping Use    Vaping status: Never Used   Substance and Sexual Activity    Alcohol use: No    Drug use: Never    Sexual activity: Yes     Partners: Male     Birth control/protection: None       FHx:   Family History   Problem Relation Age of Onset    Anxiety disorder Mother     Hyperlipidemia Mother     Learning disabilities Mother     Thyroid disease Mother     Asthma Father     COPD Father     Hyperlipidemia Father     Malig Hyperthermia Neg Hx        PGyn Hx:   PCOS  BTL  2025- nl pap/HPV    POBHx:   deferred    Allergies: Motrin [ibuprofen] and Sulfa antibiotics    Medications:   No current facility-administered medications on file prior to encounter.     Current Outpatient Medications on File Prior to Encounter   Medication Sig Dispense Refill    amLODIPine (NORVASC) 5 MG tablet TAKE 1 TABLET BY MOUTH DAILY 90 tablet 3    naltrexone-bupropion ER (CONTRAVE) 8-90 MG tablet Wk 1: 1 tab daily, Wk 2: 1 tab twice a day, Wk 3: 2 tabs in AM, 1 tab in PM, Wk 4: 2 tabs twice a day, Maintenance dose: 2 tabs twice daily. 120 tablet 0     spironolactone (ALDACTONE) 50 MG tablet TAKE 1 TABLET BY MOUTH DAILY 90 tablet 1    tiZANidine (ZANAFLEX) 4 MG tablet Take 1 tablet by mouth Every 8 (Eight) Hours As Needed for Muscle Spasms. 30 tablet 0       LMP 02/12/2025 (Approximate)     Review of Systems   Constitutional:  Positive for activity change.   Genitourinary:  Positive for genital sores. Negative for vaginal bleeding and vaginal pain.       Physical Exam  Vitals and nursing note reviewed. Exam conducted with a chaperone present.   Constitutional:       Appearance: She is well-developed.   HENT:      Head: Normocephalic and atraumatic.   Eyes:      General: No scleral icterus.     Conjunctiva/sclera: Conjunctivae normal.   Neck:      Thyroid: No thyromegaly.   Abdominal:      General: Bowel sounds are normal. There is no distension.      Palpations: Abdomen is soft. There is no mass.      Tenderness: There is no abdominal tenderness. There is no guarding or rebound.      Hernia: No hernia is present.   Genitourinary:     Labia:         Right: Lesion present.         Left: Lesion present.        Skin:     General: Skin is warm and dry.   Neurological:      Mental Status: She is alert and oriented to person, place, and time.   Psychiatric:         Behavior: Behavior normal.         Thought Content: Thought content normal.         Judgment: Judgment normal.         Debilities/Disabilities Identified: None    Labs:     Lab Results (last 7 days)       ** No results found for the last 168 hours. **            Assessment/Plan:   Genital warts- warts are too extensive for removal in the office and/or with [facility]. Plan EUA and excisoin of vaginal warts.         I discussed the patients findings and my recommendations with patient.     Mahsa Macias MD  03/24/25  16:43 EDT

## 2025-03-25 ENCOUNTER — HOSPITAL ENCOUNTER (OUTPATIENT)
Facility: HOSPITAL | Age: 35
Setting detail: HOSPITAL OUTPATIENT SURGERY
Discharge: HOME | End: 2025-03-25
Attending: OBSTETRICS & GYNECOLOGY | Admitting: OBSTETRICS & GYNECOLOGY
Payer: COMMERCIAL

## 2025-03-25 ENCOUNTER — ANESTHESIA (OUTPATIENT)
Dept: PERIOP | Facility: HOSPITAL | Age: 35
End: 2025-03-25
Payer: COMMERCIAL

## 2025-03-25 VITALS
WEIGHT: 244.2 LBS | DIASTOLIC BLOOD PRESSURE: 75 MMHG | TEMPERATURE: 98 F | RESPIRATION RATE: 20 BRPM | OXYGEN SATURATION: 95 % | SYSTOLIC BLOOD PRESSURE: 112 MMHG | HEART RATE: 82 BPM | BODY MASS INDEX: 38.25 KG/M2

## 2025-03-25 DIAGNOSIS — A63.0 GENITAL WARTS: ICD-10-CM

## 2025-03-25 DIAGNOSIS — G89.18 POSTOPERATIVE PAIN: Primary | ICD-10-CM

## 2025-03-25 PROCEDURE — 25010000002 DEXAMETHASONE PER 1 MG

## 2025-03-25 PROCEDURE — 25010000002 FENTANYL CITRATE (PF) 50 MCG/ML SOLUTION: Performed by: NURSE ANESTHETIST, CERTIFIED REGISTERED

## 2025-03-25 PROCEDURE — 25010000002 PROPOFOL 200 MG/20ML EMULSION: Performed by: NURSE ANESTHETIST, CERTIFIED REGISTERED

## 2025-03-25 PROCEDURE — 25010000002 LIDOCAINE 2% SOLUTION: Performed by: NURSE ANESTHETIST, CERTIFIED REGISTERED

## 2025-03-25 PROCEDURE — 11426 EXC H-F-NK-SP B9+MARG >4 CM: CPT | Performed by: OBSTETRICS & GYNECOLOGY

## 2025-03-25 PROCEDURE — 25010000002 ONDANSETRON PER 1 MG

## 2025-03-25 PROCEDURE — 88304 TISSUE EXAM BY PATHOLOGIST: CPT | Performed by: OBSTETRICS & GYNECOLOGY

## 2025-03-25 PROCEDURE — 25810000003 LACTATED RINGERS PER 1000 ML

## 2025-03-25 PROCEDURE — 11420 EXC H-F-NK-SP B9+MARG 0.5/<: CPT | Performed by: OBSTETRICS & GYNECOLOGY

## 2025-03-25 PROCEDURE — 25010000002 CEFAZOLIN PER 500 MG: Performed by: OBSTETRICS & GYNECOLOGY

## 2025-03-25 RX ORDER — DIPHENHYDRAMINE HYDROCHLORIDE 50 MG/ML
12.5 INJECTION, SOLUTION INTRAMUSCULAR; INTRAVENOUS
Status: DISCONTINUED | OUTPATIENT
Start: 2025-03-25 | End: 2025-03-25 | Stop reason: HOSPADM

## 2025-03-25 RX ORDER — LIDOCAINE HYDROCHLORIDE 20 MG/ML
INJECTION, SOLUTION INFILTRATION; PERINEURAL AS NEEDED
Status: DISCONTINUED | OUTPATIENT
Start: 2025-03-25 | End: 2025-03-25 | Stop reason: SURG

## 2025-03-25 RX ORDER — SODIUM CHLORIDE 0.9 % (FLUSH) 0.9 %
10 SYRINGE (ML) INJECTION AS NEEDED
Status: DISCONTINUED | OUTPATIENT
Start: 2025-03-25 | End: 2025-03-25 | Stop reason: HOSPADM

## 2025-03-25 RX ORDER — SODIUM CHLORIDE 0.9 % (FLUSH) 0.9 %
10 SYRINGE (ML) INJECTION EVERY 12 HOURS SCHEDULED
Status: DISCONTINUED | OUTPATIENT
Start: 2025-03-25 | End: 2025-03-25 | Stop reason: HOSPADM

## 2025-03-25 RX ORDER — FAMOTIDINE 10 MG/ML
20 INJECTION, SOLUTION INTRAVENOUS
Status: COMPLETED | OUTPATIENT
Start: 2025-03-25 | End: 2025-03-25

## 2025-03-25 RX ORDER — DEXAMETHASONE SODIUM PHOSPHATE 4 MG/ML
8 INJECTION, SOLUTION INTRA-ARTICULAR; INTRALESIONAL; INTRAMUSCULAR; INTRAVENOUS; SOFT TISSUE ONCE AS NEEDED
Status: COMPLETED | OUTPATIENT
Start: 2025-03-25 | End: 2025-03-25

## 2025-03-25 RX ORDER — SODIUM CHLORIDE, SODIUM LACTATE, POTASSIUM CHLORIDE, CALCIUM CHLORIDE 600; 310; 30; 20 MG/100ML; MG/100ML; MG/100ML; MG/100ML
9 INJECTION, SOLUTION INTRAVENOUS CONTINUOUS
Status: DISCONTINUED | OUTPATIENT
Start: 2025-03-25 | End: 2025-03-25 | Stop reason: HOSPADM

## 2025-03-25 RX ORDER — PROPOFOL 10 MG/ML
INJECTION, EMULSION INTRAVENOUS AS NEEDED
Status: DISCONTINUED | OUTPATIENT
Start: 2025-03-25 | End: 2025-03-25 | Stop reason: SURG

## 2025-03-25 RX ORDER — LIDOCAINE HYDROCHLORIDE 10 MG/ML
0.5 INJECTION, SOLUTION EPIDURAL; INFILTRATION; INTRACAUDAL; PERINEURAL ONCE AS NEEDED
Status: DISCONTINUED | OUTPATIENT
Start: 2025-03-25 | End: 2025-03-25 | Stop reason: HOSPADM

## 2025-03-25 RX ORDER — SODIUM CHLORIDE 9 MG/ML
40 INJECTION, SOLUTION INTRAVENOUS AS NEEDED
Status: DISCONTINUED | OUTPATIENT
Start: 2025-03-25 | End: 2025-03-25 | Stop reason: HOSPADM

## 2025-03-25 RX ORDER — BUPIVACAINE HYDROCHLORIDE AND EPINEPHRINE 5; 5 MG/ML; UG/ML
INJECTION, SOLUTION PERINEURAL AS NEEDED
Status: DISCONTINUED | OUTPATIENT
Start: 2025-03-25 | End: 2025-03-25 | Stop reason: HOSPADM

## 2025-03-25 RX ORDER — HYDROCODONE BITARTRATE AND ACETAMINOPHEN 5; 325 MG/1; MG/1
1 TABLET ORAL ONCE AS NEEDED
Status: DISCONTINUED | OUTPATIENT
Start: 2025-03-25 | End: 2025-03-25 | Stop reason: HOSPADM

## 2025-03-25 RX ORDER — HYDROCODONE BITARTRATE AND ACETAMINOPHEN 5; 325 MG/1; MG/1
1 TABLET ORAL EVERY 6 HOURS PRN
Qty: 20 TABLET | Refills: 0 | Status: SHIPPED | OUTPATIENT
Start: 2025-03-25

## 2025-03-25 RX ORDER — MIDAZOLAM HYDROCHLORIDE 2 MG/2ML
1 INJECTION, SOLUTION INTRAMUSCULAR; INTRAVENOUS
Status: DISCONTINUED | OUTPATIENT
Start: 2025-03-25 | End: 2025-03-25 | Stop reason: HOSPADM

## 2025-03-25 RX ORDER — ONDANSETRON 2 MG/ML
4 INJECTION INTRAMUSCULAR; INTRAVENOUS ONCE AS NEEDED
Status: DISCONTINUED | OUTPATIENT
Start: 2025-03-25 | End: 2025-03-25 | Stop reason: HOSPADM

## 2025-03-25 RX ORDER — ONDANSETRON 2 MG/ML
4 INJECTION INTRAMUSCULAR; INTRAVENOUS ONCE AS NEEDED
Status: COMPLETED | OUTPATIENT
Start: 2025-03-25 | End: 2025-03-25

## 2025-03-25 RX ORDER — FENTANYL CITRATE 50 UG/ML
25 INJECTION, SOLUTION INTRAMUSCULAR; INTRAVENOUS
Status: DISCONTINUED | OUTPATIENT
Start: 2025-03-25 | End: 2025-03-25 | Stop reason: HOSPADM

## 2025-03-25 RX ORDER — FENTANYL CITRATE 50 UG/ML
INJECTION, SOLUTION INTRAMUSCULAR; INTRAVENOUS AS NEEDED
Status: DISCONTINUED | OUTPATIENT
Start: 2025-03-25 | End: 2025-03-25 | Stop reason: SURG

## 2025-03-25 RX ADMIN — ONDANSETRON 4 MG: 2 INJECTION INTRAMUSCULAR; INTRAVENOUS at 11:18

## 2025-03-25 RX ADMIN — PROPOFOL 50 MG: 10 INJECTION, EMULSION INTRAVENOUS at 13:00

## 2025-03-25 RX ADMIN — FAMOTIDINE 20 MG: 10 INJECTION INTRAVENOUS at 11:19

## 2025-03-25 RX ADMIN — PROPOFOL 50 MG: 10 INJECTION, EMULSION INTRAVENOUS at 12:55

## 2025-03-25 RX ADMIN — FENTANYL CITRATE 25 MCG: 50 INJECTION, SOLUTION INTRAMUSCULAR; INTRAVENOUS at 12:35

## 2025-03-25 RX ADMIN — FENTANYL CITRATE 25 MCG: 50 INJECTION, SOLUTION INTRAMUSCULAR; INTRAVENOUS at 12:30

## 2025-03-25 RX ADMIN — PROPOFOL 100 MG: 10 INJECTION, EMULSION INTRAVENOUS at 12:45

## 2025-03-25 RX ADMIN — PROPOFOL 50 MG: 10 INJECTION, EMULSION INTRAVENOUS at 12:50

## 2025-03-25 RX ADMIN — SODIUM CHLORIDE, SODIUM LACTATE, POTASSIUM CHLORIDE, AND CALCIUM CHLORIDE 9 ML/HR: 600; 310; 30; 20 INJECTION, SOLUTION INTRAVENOUS at 11:21

## 2025-03-25 RX ADMIN — DEXAMETHASONE SODIUM PHOSPHATE 8 MG: 4 INJECTION INTRA-ARTICULAR; INTRALESIONAL; INTRAMUSCULAR; INTRAVENOUS; SOFT TISSUE at 11:19

## 2025-03-25 RX ADMIN — PROPOFOL 100 MG: 10 INJECTION, EMULSION INTRAVENOUS at 12:35

## 2025-03-25 RX ADMIN — CEFAZOLIN 2000 MG: 2 INJECTION, POWDER, FOR SOLUTION INTRAVENOUS at 12:36

## 2025-03-25 RX ADMIN — PROPOFOL 100 MG: 10 INJECTION, EMULSION INTRAVENOUS at 12:40

## 2025-03-25 RX ADMIN — LIDOCAINE HYDROCHLORIDE 80 MG: 20 INJECTION, SOLUTION INFILTRATION; PERINEURAL at 12:35

## 2025-03-25 NOTE — OP NOTE
Subjective     Date of Service:  03/25/25  Time of Service:  13:10 EDT    Surgical Staff: Surgeons and Role:     * Mahsa Macias MD - Primary   Additional Staff: none   Pre-operative diagnosis(es): Pre-Op Diagnosis Codes:      * Genital warts [A63.0]     Post-operative diagnosis(es): Post-Op Diagnosis Codes:     * Genital warts [A63.0]   Procedure(s): Procedure(s):  GYNECOLOGY EXAM UNDER ANESTHESIA, removal of bilateral genital warts     Antibiotics: cefazolin (Ancef) ordered on call to OR     Anesthesia: Type: Choice  ASA:  III     Objective      Operative findings: 3 condyloma on R labia, 0.5 cm  1 condyloma on L labia, 0.3 cm  Multiple exophytic condyloma at introitus, 4 x 3 cm wedge resection     Specimens removed: ID Type Source Tests Collected by Time   A : Right Labia: Genital Warts Tissue Labia TISSUE PATHOLOGY EXAM Mahsa Macias MD 3/25/2025 1248   B : Left Labia: genital warts Tissue Labia TISSUE PATHOLOGY EXAM Mahsa Macias MD 3/25/2025 1251   C :  Tissue Perineum TISSUE PATHOLOGY EXAM Mahsa Macias MD 3/25/2025 1253      Fluid Intake: 150mL   Output: Documented Output  Est. Blood Loss 10  mL  Urine Output 70 mL      I/O this shift:  In: 100 [IV Piggyback:100]  Out: -      Blood products used: No   Drains: * No LDAs found *   Implant Information: Nothing was implanted during the procedure   Complications: None apparent   Intraoperative consult(s):    Condition: stable   Disposition: to PACU and then admit to   home           Description of Procedure:   Ms. tate is a rebecca 34-year-old with multiple vulvar condyloma.  The condyloma on her labia are small, however, she has a large area of condyloma at the vaginal introitus that are too large to remove in the office and too large for topical therapy.  After informed consent was obtained, the patient was taken to the operating room where she is placed in supine position in yellowfin stirrups and prepped and  draped in normal sterile fashion after a timeout procedure.  There were 3 condyloma on the right labia that were all less than 0.5 cm.  These were infiltrated with lidocaine, removed with a 15 blade scalpel and sent for permanent section.  The bleeding points were then bovied and these areas were hemostatic.  There was 1 condyloma on the left labia that was 0.3 cm that was removed in a similar fashion with a scalpel and sent for permanent section.  This area was then bovied for hemostasis.  The marking pen was then used to make an elliptical incision at the introitus to encompass the dense area of exophytic condyloma .  Infiltrated with lidocaine and then removed in a wedge fashion using the 15 blade scalpel.  Bleeding points were then bovied and this incision was closed with 3-o Vicryl in a running fashion. Goo d hemostasis was noted.  Deemed complete.  All counts correct for the procedure.  Local care instructions were reviewed with the patient detail and all her questions were answered.  She should follow-up in the office in 2 weeks for postop check.    Mahsa Macias MD

## 2025-03-25 NOTE — ANESTHESIA PREPROCEDURE EVALUATION
Anesthesia Evaluation     Patient summary reviewed and Nursing notes reviewed   no history of anesthetic complications:   NPO Solid Status: > 8 hours  NPO Liquid Status: > 2 hours           Airway   Mallampati: II  TM distance: >3 FB  Neck ROM: full  Small opening and Possible difficult intubation  Dental      Pulmonary - negative pulmonary ROS    breath sounds clear to auscultation  Cardiovascular   Exercise tolerance: good (4-7 METS)    Rhythm: regular  Rate: normal    (+) hypertension well controlled 2 medications or greater      Neuro/Psych  (+) psychiatric history Anxiety  GI/Hepatic/Renal/Endo    (+) obesity, GERD (diet controlled)    Musculoskeletal     (+) back pain  Abdominal   (+) obese   Substance History - negative use     OB/GYN      Comment: Polycystic ovary syndrome       Other - negative ROS                   Anesthesia Plan    ASA 3     MAC     intravenous induction     Anesthetic plan, risks, benefits, and alternatives have been provided, discussed and informed consent has been obtained with: patient.    Use of blood products discussed with patient  Consented to blood products.      CODE STATUS:

## 2025-03-25 NOTE — ANESTHESIA POSTPROCEDURE EVALUATION
Patient: Oralia Lawrence    Procedure Summary       Date: 03/25/25 Room / Location:  [facility] /  [facility]    Anesthesia Start: 1230 Anesthesia Stop: 1311    Procedure: GYNECOLOGY EXAM UNDER ANESTHESIA, removal of bilateral genital warts (Bilateral: Pelvis) Diagnosis:       Genital warts      (Genital warts [A63.0])    Surgeons: Mahsa Macias MD Provider: Juliocesar Aldana CRNA    Anesthesia Type: MAC ASA Status: 3            Anesthesia Type: MAC    Vitals  Vitals Value Taken Time   /78 03/25/25 13:45   Temp     Pulse 93 03/25/25 13:49   Resp 20 03/25/25 13:35   SpO2 97 % 03/25/25 13:48   Vitals shown include unfiled device data.        Post Anesthesia Care and Evaluation    Patient location during evaluation: PHASE II  Patient participation: complete - patient participated  Level of consciousness: awake and alert  Pain score: 2  Pain management: satisfactory to patient    Airway patency: patent  Anesthetic complications: No anesthetic complications  PONV Status: none  Cardiovascular status: acceptable  Respiratory status: acceptable  Hydration status: acceptable

## 2025-03-25 NOTE — INTERVAL H&P NOTE
H&P reviewed. The patient was examined and there are no changes to the H&P.  /60 (BP Location: Right arm, Patient Position: Sitting)   Pulse 88   Temp 98 °F (36.7 °C) (Oral)   Resp 18   Wt 111 kg (244 lb 3.2 oz)   LMP 03/22/2025 (Approximate)   SpO2 98%   BMI 38.25 kg/m²   Procedure reviewed and all questions answered.  Mahsa Macias MD

## 2025-03-27 LAB
CYTO UR: NORMAL
LAB AP CASE REPORT: NORMAL
PATH REPORT.FINAL DX SPEC: NORMAL
PATH REPORT.GROSS SPEC: NORMAL

## 2025-04-07 ENCOUNTER — OFFICE VISIT (OUTPATIENT)
Dept: OBSTETRICS AND GYNECOLOGY | Facility: CLINIC | Age: 35
End: 2025-04-07
Payer: COMMERCIAL

## 2025-04-07 VITALS
HEIGHT: 67 IN | BODY MASS INDEX: 38.61 KG/M2 | DIASTOLIC BLOOD PRESSURE: 80 MMHG | WEIGHT: 246 LBS | SYSTOLIC BLOOD PRESSURE: 132 MMHG

## 2025-04-07 DIAGNOSIS — Z23 NEED FOR HPV VACCINATION: Primary | ICD-10-CM

## 2025-04-07 DIAGNOSIS — N90.0 VULVAR INTRAEPITHELIAL NEOPLASIA (VIN) GRADE 1: ICD-10-CM

## 2025-04-07 DIAGNOSIS — Z13.89 SCREENING FOR GENITOURINARY CONDITION: ICD-10-CM

## 2025-04-07 DIAGNOSIS — Z09 POSTOPERATIVE FOLLOW-UP: ICD-10-CM

## 2025-04-07 LAB
B-HCG UR QL: NEGATIVE
BILIRUB BLD-MCNC: NEGATIVE MG/DL
CLARITY, POC: CLEAR
COLOR UR: YELLOW
EXPIRATION DATE: NORMAL
GLUCOSE UR STRIP-MCNC: NEGATIVE MG/DL
INTERNAL NEGATIVE CONTROL: NORMAL
INTERNAL POSITIVE CONTROL: NORMAL
KETONES UR QL: NEGATIVE
LEUKOCYTE EST, POC: NEGATIVE
Lab: NORMAL
NITRITE UR-MCNC: NEGATIVE MG/ML
PH UR: 5 [PH] (ref 5–8)
PROT UR STRIP-MCNC: ABNORMAL MG/DL
RBC # UR STRIP: ABNORMAL /UL
SP GR UR: 1.02 (ref 1–1.03)
UROBILINOGEN UR QL: ABNORMAL

## 2025-04-07 NOTE — PROGRESS NOTES
"      Oralia Lawrence is a 34 y.o. patient who presents for follow up of   Chief Complaint   Patient presents with    Post-op       33 yo est pt here for 2 week postop check. She had removal of genital warts from labia and perirectal area. Her path showed JUAN MIGUEL 1/condyloma. She has done well postop but is still sore with sitting, not needing meds. She is agreeable to starting Gardasil vaccine today.       The following portions of the patient's history were reviewed and updated as appropriate: allergies, current medications and problem list.    Review of Systems   Constitutional:  Positive for activity change.   Genitourinary:  Positive for vaginal pain. Negative for dyspareunia, vaginal bleeding and vaginal discharge.       /80   Ht 170.2 cm (67\")   Wt 112 kg (246 lb)   LMP 03/22/2025 (Approximate)   Breastfeeding No   BMI 38.53 kg/m²     Physical Exam  Vitals and nursing note reviewed. Exam conducted with a chaperone present.   Constitutional:       Appearance: She is well-developed.   HENT:      Head: Normocephalic and atraumatic.   Eyes:      General: No scleral icterus.     Conjunctiva/sclera: Conjunctivae normal.   Neck:      Thyroid: No thyromegaly.   Abdominal:      General: There is no distension.      Palpations: Abdomen is soft. There is no mass.      Tenderness: There is no abdominal tenderness. There is no guarding or rebound.      Hernia: No hernia is present.   Genitourinary:     Labia:         Right: Injury present.         Left: Injury present.        Skin:     General: Skin is warm and dry.   Neurological:      Mental Status: She is alert and oriented to person, place, and time.   Psychiatric:         Behavior: Behavior normal.         Thought Content: Thought content normal.         Judgment: Judgment normal.         A/P:  1. Postop removal of condyloma- SHANT 1. Risk of recurrence d/w pt  2. Discussed with patient risks, benefits and alternatives to the Gardasil vaccination.  The " vaccine is administered in the arm in a series of 3 shots at 02 in 6 months.  It provides a 70 to 90% reduction in HPV related diseases such as abnormal Pap smears, genital warts and cervical cancer.  The vaccine was originally approved for ages 9-26, but is recently been expanded to the age of 45.  The most common side effects are pain at the injection site and fainting.  Any and all adverse side effects are tracked by the FDA and are available on their website for review.  After consideration, the patient plans first dose today. RTO in 2 & 6 months for second and third gardasil shots ( nurse only)  3. RTO in 6 months recheck vulva.   4. Annual UTD 4/2024- nl pap/HPV    Assessment & Plan   Diagnoses and all orders for this visit:    1. Need for HPV vaccination (Primary)  -     HPV Vaccine (HPV9)    2. Screening for genitourinary condition  -     POC Urinalysis Dipstick  -     POC Pregnancy, Urine    3. Postoperative follow-up    4. Vulvar intraepithelial neoplasia (SHANT) grade 1                 No follow-ups on file.      Mahsa Macias MD    4/7/2025  14:42 EDT

## 2025-04-30 ENCOUNTER — TELEPHONE (OUTPATIENT)
Age: 35
End: 2025-04-30

## 2025-04-30 NOTE — TELEPHONE ENCOUNTER
HARRIS for patient letting her know that we need to r/s her 5.12.25 appt to another day, asked that she call me

## 2025-05-07 ENCOUNTER — OFFICE VISIT (OUTPATIENT)
Age: 35
End: 2025-05-07
Payer: COMMERCIAL

## 2025-05-07 DIAGNOSIS — F41.1 GENERALIZED ANXIETY DISORDER: Primary | ICD-10-CM

## 2025-05-07 DIAGNOSIS — F43.10 POST TRAUMATIC STRESS DISORDER (PTSD): ICD-10-CM
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
## 2025-05-08 PROBLEM — F41.1 GENERALIZED ANXIETY DISORDER: Status: ACTIVE | Noted: 2025-05-08

## 2025-05-08 PROBLEM — F43.10 POST TRAUMATIC STRESS DISORDER (PTSD): Status: ACTIVE | Noted: 2025-05-08

## 2025-05-08 NOTE — TREATMENT PLAN
Multi-Disciplinary Problems (from Behavioral Health Treatment Plan)      Active Problems       Problem: Assessment/EAP  Start Date: 05/08/25      Goal Priority Start Date Expected End Date End Date    Patient will utilize appropriate treatment services. -- 05/08/25 11/06/25 --    Goal Details: Progress toward goal:  Not appropriate to rate progress toward goal since this is the initial treatment plan.        Goal Intervention Frequency Start Date End Date    Assist patient in developing a plan of action Weekly 05/08/25 --    Intervention Details: Duration of treatment until remission of symptoms.                Problem: Anxiety  Start Date: 05/08/25      Problem Details: Client's BREE-7 score was a 10 out of 21         Goal Priority Start Date Expected End Date End Date    Patient will develop and implement behavioral and cognitive strategies to reduce anxiety and irrational fears. -- 05/08/25 11/06/25 --    Goal Details: Progress toward goal:  Not appropriate to rate progress toward goal since this is the initial treatment plan.        Goal Intervention Frequency Start Date End Date    Help patient explore past emotional issues in relation to present anxiety. Weekly 05/08/25 --    Intervention Details: Duration of treatment until remission of symptoms.        Goal Intervention Frequency Start Date End Date    Help patient develop an awareness of their cognitive and physical responses to anxiety. Weekly 05/08/25 --    Intervention Details: Duration of treatment until remission of symptoms.                Problem: Trauma and Stressor Related Disorders  Start Date: 05/08/25      Problem Details: Client's PCL-5 score was a 33 out of 80           Goal Priority Start Date Expected End Date End Date    Patient will process and move through trauma in a way that improves self regard and the patients ability to function optimally in the world around them. -- 05/08/25 11/06/25 --    Goal Details: Progress toward goal:  Not  appropriate to rate progress toward goal since this is the initial treatment plan.        Goal Intervention Frequency Start Date End Date    Assist patient in identifying ways that trauma has negatively impacted their view of themselves and the world. Q2 Weeks 05/08/25 --    Intervention Details: Duration of treatment until remission of symptoms.        Goal Intervention Frequency Start Date End Date    Process trauma in the context of the safe session environment. Q2 Weeks 05/08/25 --    Intervention Details: Duration of treatment until remission of symptoms.        Goal Intervention Frequency Start Date End Date    Develop a plan of behavior changes that will reduce the stress of the trauma. Q2 Weeks 05/08/25 --    Intervention Details: Duration of treatment until remission of symptoms.                        Reviewed By       Ambar Walsh LCSW 05/08/25 0895                     I have discussed and reviewed this treatment plan with the patient.

## 2025-05-08 NOTE — PLAN OF CARE
Short term treatment goals include continuing to develop rapport with clinician and following treatment recommendations. Long term treatment goals include processing emotions, stressors, and trauma in a safe environment, reducing levels of anxiety, stress, and PTSD in a safe environment, improving motivation, and developing appropriate coping skills.

## 2025-05-22 ENCOUNTER — OFFICE VISIT (OUTPATIENT)
Age: 35
End: 2025-05-22
Payer: COMMERCIAL

## 2025-05-22 DIAGNOSIS — F43.10 POST TRAUMATIC STRESS DISORDER (PTSD): ICD-10-CM

## 2025-05-22 DIAGNOSIS — F41.1 GENERALIZED ANXIETY DISORDER: Primary | ICD-10-CM

## 2025-05-22 NOTE — PROGRESS NOTES
[facility]      Follow Up Adult Note     Date:2025   Client Name: Oralia Lawrence  : 1990   MRN: [id number]   Time IN: 8:08 AM    Time OUT: 9:01 AM     Referring Provider: Stephanie Pierce MD    Chief Complaint:      ICD-10-CM ICD-9-CM   1. Generalized anxiety disorder  F41.1 300.02   2. Post traumatic stress disorder (PTSD)  F43.10 309.81        History of Present Illness:   Oralia Lawrence is a 34 y.o. female who is being seen today for follow up individual psychotherapy counseling for anxiety and PTSD.       Objective     PHQ-9 Depression Screening  Little interest or pleasure in doing things? Not at all   Feeling down, depressed, or hopeless? Not at all   PHQ-2 Total Score 0   Trouble falling or staying asleep, or sleeping too much? Over half   Feeling tired or having little energy? Over half   Poor appetite or overeating? Not at all   Feeling bad about yourself - or that you are a failure or have let yourself or your family down? Not at all   Trouble concentrating on things, such as reading the newspaper or watching television? Not at all   Moving or speaking so slowly that other people could have noticed? Or the opposite - being so fidgety or restless that you have been moving around a lot more than usual? Not at all   Thoughts that you would be better off dead, or of hurting yourself in some way? Not at all   PHQ-9 Total Score 4   If you checked off any problems, how difficult have these problems made it for you to do your work, take care of things at home, or get along with other people? Somewhat difficult        BREE-7  Feeling nervous, anxious or on edge: Nearly every day  Not being able to stop or control worrying: More than half the days  Worrying too much about different things: More than half the days  Trouble Relaxing: More than half the days  Being so restless that it is hard to sit still: Several days  Feeling afraid as if something awful might happen:  "Not at all  Becoming easily annoyed or irritable: Nearly every day  BREE 7 Total Score: 13  If you checked any problems, how difficult have these problems made it for you to do your work, take care of things at home, or get along with other people: Very difficult      Medications:     Current Outpatient Medications:     amLODIPine (NORVASC) 5 MG tablet, TAKE 1 TABLET BY MOUTH DAILY, Disp: 90 tablet, Rfl: 3    HYDROcodone-acetaminophen (NORCO) 5-325 MG per tablet, Take 1 tablet by mouth Every 6 (Six) Hours As Needed for Severe Pain., Disp: 20 tablet, Rfl: 0    naltrexone-bupropion ER (CONTRAVE) 8-90 MG tablet, Wk 1: 1 tab daily, Wk 2: 1 tab twice a day, Wk 3: 2 tabs in AM, 1 tab in PM, Wk 4: 2 tabs twice a day, Maintenance dose: 2 tabs twice daily., Disp: 120 tablet, Rfl: 0    spironolactone (ALDACTONE) 50 MG tablet, TAKE 1 TABLET BY MOUTH DAILY, Disp: 90 tablet, Rfl: 1    tiZANidine (ZANAFLEX) 4 MG tablet, Take 1 tablet by mouth Every 8 (Eight) Hours As Needed for Muscle Spasms., Disp: 30 tablet, Rfl: 0    Allergies:   Allergies   Allergen Reactions    Motrin [Ibuprofen] Hives     \"Can take ibuprofen but not motrin\"    Sulfa Antibiotics Unknown - Low Severity         Subjective     Mental Status Exam:   MENTAL STATUS EXAM   General Appearance:  Cleanly groomed and dressed  Eye Contact:  Good eye contact  Attitude:  Cooperative  Motor Activity:  Normal gait, posture  Muscle Strength:  Normal  Speech:  Normal rate, tone, volume  Language:  Stereotypical and unspontaneous  Mood and affect:  Normal, pleasant  Thought Process:  Logical and goal-directed  Associations/ Thought Content:  No delusions  Hallucinations:  None  Suicidal Ideations:  Not present  Homicidal Ideation:  Not present  Sensorium:  Alert and clear  Orientation:  Person, place, time and situation  Immediate Recall, Recent, and Remote Memory:  Intact  Attention Span/ Concentration:  Good  Fund of Knowledge:  Appropriate for age and educational " level  Intellectual Functioning:  Average range  Insight:  Good  Judgement:  Good  Reliability:  Good  Impulse Control:  Good       Client's Support Network Includes:   family, best friend, [employer] family    Functional Status: No impairment    Progress toward goal: Not at goal    Prognosis: Good with Ongoing Treatment     Assessment / Plan / Progress     Visit Diagnosis/Orders Placed This Visit:    ICD-10-CM ICD-9-CM   1. Generalized anxiety disorder  F41.1 300.02   2. Post traumatic stress disorder (PTSD)  F43.10 309.81        SUICIDE RISK ASSESSMENT/CSSRS:  1. Does client have thoughts of suicide? Patient denies  2. Does client have intent for suicide? Patient denies  3. Does client have a current plan for suicide? Patient denies   4. History of suicide attempts: Patient denies   5. Family history of suicide or attempts: Patient denies   6. History of violent behaviors towards others or property or thoughts of committing suicide: Patient denies   7. History of sexual aggression toward others: Patient denies   8. Access to firearms or weapons: Yes     PLAN:  Safety: No acute safety concerns  Risk Assessment: Risk of self-harm acutely is low. Risk of self-harm chronically is also low, but could be further elevated in the event of treatment noncompliance and/or AODA.    Presenting Problem: Client reported that she is feeling good. She reported that her daughter is having a hard time accepting her diagnoses, despite her daughter also having anxiety. She reported that her car is in the shop which has caused a little stress. She reported that she put her headphones in for a few minutes and composed herself for a few minutes at the time. She reported that putting in her headphones allows her to process things. She reported that her anxiety has been ok over the past week. She reported that things being out of her control and big things happening with her son are triggers for anxiety. She reported that her friend taught her  a technique of talking to her emotions as a way to cope.     Treatment Plan/Goals & Progress: Clinician reviewed client's treatment plan with client. Client was in agreement with the treatment plan. Continue supportive psychotherapy efforts and medications as indicated. Treatment options discussed during today's visit. Client ackowledged and verbally consented to continue with current treatment plan and was educated on the importance of compliance with treatment and follow-up appointments. Client seems reasonably able to adhere to treatment plan.      Assisted client in processing above session content; acknowledged and normalized client’s thoughts, feelings, and concerns.  Rationalized client's thought process regarding anxiety and PTSD.      Allowed client to freely discuss issues without interruption or judgement with unconditional positive regard, active listening skills, and empathy. Clinician provided a safe, confidential environment to facilitate the development of a positive therapeutic relationship and encouraged open, honest communication. Assisted client in identifying risk factors which would indicate the need for higher level of care including thoughts to harm self or others and/or self-harming behavior and encouraged client to contact this office, call 911, or present to the nearest emergency room should any of these events occur. Discussed crisis intervention services and means to access. Client adamantly and convincingly denies current suicidal or homicidal ideation or perceptual disturbance. Assisted client in processing session content; acknowledged and normalized client’s thoughts, feelings, and concerns by utilizing a person-centered approach in efforts to build appropriate rapport and a positive therapeutic relationship with open and honest communication.       Follow Up:   No follow-ups on file.    Ambar Walsh, [facility]  [facility]

## 2025-06-04 ENCOUNTER — CLINICAL SUPPORT (OUTPATIENT)
Dept: OBSTETRICS AND GYNECOLOGY | Facility: CLINIC | Age: 35
End: 2025-06-04
Payer: COMMERCIAL

## 2025-06-04 DIAGNOSIS — Z23 NEED FOR HPV VACCINATION: Primary | ICD-10-CM

## 2025-06-05 ENCOUNTER — OFFICE VISIT (OUTPATIENT)
Age: 35
End: 2025-06-05
Payer: COMMERCIAL

## 2025-06-05 DIAGNOSIS — F43.10 POST TRAUMATIC STRESS DISORDER (PTSD): ICD-10-CM

## 2025-06-05 DIAGNOSIS — F41.1 GENERALIZED ANXIETY DISORDER: Primary | ICD-10-CM

## 2025-06-05 NOTE — PROGRESS NOTES
[facility]      Follow Up Adult Note     Date:2025   Client Name: Oralia Lawrence  : 1990   MRN: [id number]   Time IN: 8:09 AM    Time OUT: 9:03 AM     Referring Provider: Stephanie Pierce MD    Chief Complaint:      ICD-10-CM ICD-9-CM   1. Generalized anxiety disorder  F41.1 300.02   2. Post traumatic stress disorder (PTSD)  F43.10 309.81        History of Present Illness:   Oralia Lawrence is a 34 y.o. female who is being seen today for follow up individual psychotherapy counseling for anxiety and PTSD.      Objective     PHQ-9 Depression Screening  Little interest or pleasure in doing things? Over half   Feeling down, depressed, or hopeless? Several days   PHQ-2 Total Score 3   Trouble falling or staying asleep, or sleeping too much? Several days   Feeling tired or having little energy? Over half   Poor appetite or overeating? Not at all   Feeling bad about yourself - or that you are a failure or have let yourself or your family down? Not at all   Trouble concentrating on things, such as reading the newspaper or watching television? Not at all   Moving or speaking so slowly that other people could have noticed? Or the opposite - being so fidgety or restless that you have been moving around a lot more than usual? Not at all   Thoughts that you would be better off dead, or of hurting yourself in some way? Not at all   PHQ-9 Total Score 6   If you checked off any problems, how difficult have these problems made it for you to do your work, take care of things at home, or get along with other people?          BREE-7  Feeling nervous, anxious or on edge: More than half the days  Not being able to stop or control worrying: Not at all  Worrying too much about different things: More than half the days  Trouble Relaxing: Several days  Being so restless that it is hard to sit still: More than half the days  Feeling afraid as if something awful might happen: Not at  "all  Becoming easily annoyed or irritable: Nearly every day  BREE 7 Total Score: 10      Medications:     Current Outpatient Medications:     amLODIPine (NORVASC) 5 MG tablet, TAKE 1 TABLET BY MOUTH DAILY, Disp: 90 tablet, Rfl: 3    HYDROcodone-acetaminophen (NORCO) 5-325 MG per tablet, Take 1 tablet by mouth Every 6 (Six) Hours As Needed for Severe Pain., Disp: 20 tablet, Rfl: 0    naltrexone-bupropion ER (CONTRAVE) 8-90 MG tablet, Wk 1: 1 tab daily, Wk 2: 1 tab twice a day, Wk 3: 2 tabs in AM, 1 tab in PM, Wk 4: 2 tabs twice a day, Maintenance dose: 2 tabs twice daily., Disp: 120 tablet, Rfl: 0    spironolactone (ALDACTONE) 50 MG tablet, TAKE 1 TABLET BY MOUTH DAILY, Disp: 90 tablet, Rfl: 1    tiZANidine (ZANAFLEX) 4 MG tablet, Take 1 tablet by mouth Every 8 (Eight) Hours As Needed for Muscle Spasms., Disp: 30 tablet, Rfl: 0    Allergies:   Allergies   Allergen Reactions    Motrin [Ibuprofen] Hives     \"Can take ibuprofen but not motrin\"    Sulfa Antibiotics Unknown - Low Severity         Subjective     Mental Status Exam:   MENTAL STATUS EXAM   General Appearance:  Cleanly groomed and dressed  Eye Contact:  Good eye contact  Attitude:  Cooperative  Motor Activity:  Normal gait, posture  Muscle Strength:  Normal  Speech:  Normal rate, tone, volume  Language:  Stereotypical and unspontaneous  Mood and affect:  Normal, pleasant  Thought Process:  Logical and goal-directed  Associations/ Thought Content:  No delusions  Hallucinations:  None  Suicidal Ideations:  Not present  Homicidal Ideation:  Not present  Sensorium:  Alert and clear  Orientation:  Person, place, time and situation  Immediate Recall, Recent, and Remote Memory:  Intact  Attention Span/ Concentration:  Good  Fund of Knowledge:  Appropriate for age and educational level  Intellectual Functioning:  Average range  Insight:  Good  Judgement:  Good  Reliability:  Good  Impulse Control:  Good       Client's Support Network Includes:  family, best friend, " [employer] family    Functional Status: No impairment    Progress toward goal: Not at goal    Prognosis: Good with Ongoing Treatment     Assessment / Plan / Progress     Visit Diagnosis/Orders Placed This Visit:    ICD-10-CM ICD-9-CM   1. Generalized anxiety disorder  F41.1 300.02   2. Post traumatic stress disorder (PTSD)  F43.10 309.81        SUICIDE RISK ASSESSMENT/CSSRS:  1. Does client have thoughts of suicide? Patient denies  2. Does client have intent for suicide? Patient denies  3. Does client have a current plan for suicide? Patient denies   4. History of suicide attempts: Patient denies   5. Family history of suicide or attempts: Patient denies   6. History of violent behaviors towards others or property or thoughts of committing suicide: Patient denies   7. History of sexual aggression toward others: Patient denies   8. Access to firearms or weapons: Yes     PLAN:  Safety: No acute safety concerns  Risk Assessment: Risk of self-harm acutely is low. Risk of self-harm chronically is also low, but could be further elevated in the event of treatment noncompliance and/or AODA.    Presenting Problem: Client reported that she is feeling alright. She reported that she is having difficulty with setting adult and child boundaries with her daughter. She reported that her daughter is accusing her of being promiscuous because of her past. She reported that she had a panic attack yesterday when her transportation failed to show up to take her to her appointment which triggered her abandonment issues. She discussed her abandonment issues and how she felt abandoned by her best friend when they broke up. She reported that her mom and her best friend are the only two people who get her. She discussed her relationship with her  and whether she would like to divorce him.     Treatment Plan/Goals & Progress: Continue supportive psychotherapy efforts and medications as indicated. Treatment options discussed during today's  visit. Client ackowledged and verbally consented to continue with current treatment plan and was educated on the importance of compliance with treatment and follow-up appointments. Client seems reasonably able to adhere to treatment plan.      Assisted client in processing above session content; acknowledged and normalized client’s thoughts, feelings, and concerns.  Rationalized client's thought process regarding anxiety and PTSD.      Allowed client to freely discuss issues without interruption or judgement with unconditional positive regard, active listening skills, and empathy. Clinician provided a safe, confidential environment to facilitate the development of a positive therapeutic relationship and encouraged open, honest communication. Assisted client in identifying risk factors which would indicate the need for higher level of care including thoughts to harm self or others and/or self-harming behavior and encouraged client to contact this office, call 911, or present to the nearest emergency room should any of these events occur. Discussed crisis intervention services and means to access. Client adamantly and convincingly denies current suicidal or homicidal ideation or perceptual disturbance. Assisted client in processing session content; acknowledged and normalized client’s thoughts, feelings, and concerns by utilizing a person-centered approach in efforts to build appropriate rapport and a positive therapeutic relationship with open and honest communication.       Follow Up:   Return in about 2 weeks (around 6/19/2025) for Next scheduled follow up.    Ambar Walsh [facility]  [facility]

## 2025-06-18 ENCOUNTER — OFFICE VISIT (OUTPATIENT)
Age: 35
End: 2025-06-18
Payer: COMMERCIAL

## 2025-06-18 DIAGNOSIS — F41.1 GENERALIZED ANXIETY DISORDER: Primary | ICD-10-CM

## 2025-06-18 DIAGNOSIS — F43.10 POST TRAUMATIC STRESS DISORDER (PTSD): ICD-10-CM

## 2025-06-18 NOTE — PROGRESS NOTES
[facility]      Follow Up Adult Note     Date:2025   Client Name: Oralia Lawrence  : 1990   MRN: [id number]   Time IN: 10:10 AM    Time OUT: 11:03 AM     Referring Provider: Stephanie Pierce MD    Chief Complaint:      ICD-10-CM ICD-9-CM   1. Generalized anxiety disorder  F41.1 300.02   2. Post traumatic stress disorder (PTSD)  F43.10 309.81        History of Present Illness:   Oralia Lawrence is a 34 y.o. female who is being seen today for follow up individual psychotherapy counseling for anxiety and PTSD.       Objective     PHQ-9 Depression Screening  Little interest or pleasure in doing things? Not at all   Feeling down, depressed, or hopeless? Not at all   PHQ-2 Total Score 0   Trouble falling or staying asleep, or sleeping too much? Several days   Feeling tired or having little energy? Over half   Poor appetite or overeating? Not at all   Feeling bad about yourself - or that you are a failure or have let yourself or your family down? Several days   Trouble concentrating on things, such as reading the newspaper or watching television? Not at all   Moving or speaking so slowly that other people could have noticed? Or the opposite - being so fidgety or restless that you have been moving around a lot more than usual? Not at all   Thoughts that you would be better off dead, or of hurting yourself in some way? Not at all   PHQ-9 Total Score 4   If you checked off any problems, how difficult have these problems made it for you to do your work, take care of things at home, or get along with other people? Somewhat difficult        BREE-7  Feeling nervous, anxious or on edge: More than half the days  Not being able to stop or control worrying: Not at all  Worrying too much about different things: More than half the days  Trouble Relaxing: Several days  Being so restless that it is hard to sit still: Several days  Feeling afraid as if something awful might happen: Not at  "all  Becoming easily annoyed or irritable: More than half the days  BREE 7 Total Score: 8  If you checked any problems, how difficult have these problems made it for you to do your work, take care of things at home, or get along with other people: Somewhat difficult      Medications:     Current Outpatient Medications:     amLODIPine (NORVASC) 5 MG tablet, TAKE 1 TABLET BY MOUTH DAILY, Disp: 90 tablet, Rfl: 3    HYDROcodone-acetaminophen (NORCO) 5-325 MG per tablet, Take 1 tablet by mouth Every 6 (Six) Hours As Needed for Severe Pain., Disp: 20 tablet, Rfl: 0    naltrexone-bupropion ER (CONTRAVE) 8-90 MG tablet, Wk 1: 1 tab daily, Wk 2: 1 tab twice a day, Wk 3: 2 tabs in AM, 1 tab in PM, Wk 4: 2 tabs twice a day, Maintenance dose: 2 tabs twice daily., Disp: 120 tablet, Rfl: 0    spironolactone (ALDACTONE) 50 MG tablet, TAKE 1 TABLET BY MOUTH DAILY, Disp: 90 tablet, Rfl: 1    tiZANidine (ZANAFLEX) 4 MG tablet, Take 1 tablet by mouth Every 8 (Eight) Hours As Needed for Muscle Spasms., Disp: 30 tablet, Rfl: 0    Allergies:   Allergies   Allergen Reactions    Motrin [Ibuprofen] Hives     \"Can take ibuprofen but not motrin\"    Sulfa Antibiotics Unknown - Low Severity         Subjective     Mental Status Exam:   MENTAL STATUS EXAM   General Appearance:  Cleanly groomed and dressed  Eye Contact:  Good eye contact  Attitude:  Cooperative  Motor Activity:  Normal gait, posture  Muscle Strength:  Normal  Speech:  Normal rate, tone, volume  Language:  Stereotypical and unspontaneous  Mood and affect:  Normal, pleasant  Thought Process:  Logical and goal-directed  Associations/ Thought Content:  No delusions  Hallucinations:  None  Suicidal Ideations:  Not present  Homicidal Ideation:  Not present  Sensorium:  Alert and clear  Orientation:  Person, place, time and situation  Immediate Recall, Recent, and Remote Memory:  Intact  Attention Span/ Concentration:  Good  Fund of Knowledge:  Appropriate for age and educational " level  Intellectual Functioning:  Average range  Insight:  Good  Judgement:  Good  Reliability:  Good  Impulse Control:  Good       Client's Support Network Includes:  family, best friend, [employer] family    Functional Status: No impairment    Progress toward goal: Not at goal    Prognosis: Good with Ongoing Treatment     Assessment / Plan / Progress     Visit Diagnosis/Orders Placed This Visit:    ICD-10-CM ICD-9-CM   1. Generalized anxiety disorder  F41.1 300.02   2. Post traumatic stress disorder (PTSD)  F43.10 309.81        SUICIDE RISK ASSESSMENT/CSSRS:  1. Does client have thoughts of suicide? Patient denies  2. Does client have intent for suicide? Patient denies  3. Does client have a current plan for suicide? Patient denies   4. History of suicide attempts: Patient denies   5. Family history of suicide or attempts: Patient denies   6. History of violent behaviors towards others or property or thoughts of committing suicide: Patient denies   7. History of sexual aggression toward others: Patient denies   8. Access to firearms or weapons: Yes     PLAN:  Safety: No acute safety concerns  Risk Assessment: Risk of self-harm acutely is low. Risk of self-harm chronically is also low, but could be further elevated in the event of treatment noncompliance and/or AODA.    Presenting Problem: Client reported that she is feeling good. She reported that she is still not having adult/child boundaries with her daughter. She reported that she continues to have issues with her car that is not drivable. She reported that she confronted her  about having women on his phone and needing to repent, however, he refused. She reported that she feels like she's trapped. She reported that she would like to have a home with 10-15 acres when she is in her 50s. She reported that she needs to improve her credit and would like to figure out if she will be with her  long term in order to accomplish this goal.     Treatment  Plan/Goals & Progress: Continue supportive psychotherapy efforts and medications as indicated. Treatment options discussed during today's visit. Client ackowledged and verbally consented to continue with current treatment plan and was educated on the importance of compliance with treatment and follow-up appointments. Client seems reasonably able to adhere to treatment plan.      Assisted client in processing above session content; acknowledged and normalized client’s thoughts, feelings, and concerns.  Rationalized client's thought process regarding anxiety and PTSD.      Allowed client to freely discuss issues without interruption or judgement with unconditional positive regard, active listening skills, and empathy. Clinician provided a safe, confidential environment to facilitate the development of a positive therapeutic relationship and encouraged open, honest communication. Assisted client in identifying risk factors which would indicate the need for higher level of care including thoughts to harm self or others and/or self-harming behavior and encouraged client to contact this office, call 911, or present to the nearest emergency room should any of these events occur. Discussed crisis intervention services and means to access. Client adamantly and convincingly denies current suicidal or homicidal ideation or perceptual disturbance. Assisted client in processing session content; acknowledged and normalized client’s thoughts, feelings, and concerns by utilizing a person-centered approach in efforts to build appropriate rapport and a positive therapeutic relationship with open and honest communication.       Follow Up:   Return in about 2 weeks (around 7/2/2025) for Next scheduled follow up.    Ambar Walsh LCSW  [facility]

## 2025-07-09 ENCOUNTER — OFFICE VISIT (OUTPATIENT)
Age: 35
End: 2025-07-09
Payer: COMMERCIAL

## 2025-07-09 DIAGNOSIS — F43.10 POST TRAUMATIC STRESS DISORDER (PTSD): ICD-10-CM

## 2025-07-09 DIAGNOSIS — F41.1 GENERALIZED ANXIETY DISORDER: Primary | ICD-10-CM

## 2025-07-09 NOTE — PROGRESS NOTES
[facility]      Follow Up Adult Note     Date:2025   Client Name: Oralia Lawrence  : 1990   MRN: [id number]   Time IN: 9:10 AM    Time OUT: 10:03 AM     Referring Provider: Stephanie Pierce MD    Chief Complaint:      ICD-10-CM ICD-9-CM   1. Generalized anxiety disorder  F41.1 300.02   2. Post traumatic stress disorder (PTSD)  F43.10 309.81        History of Present Illness:   Oralia Lawrence is a 34 y.o. female who is being seen today for follow up individual psychotherapy counseling for anxiety and PTSD.       Objective     PHQ-9 Depression Screening  Little interest or pleasure in doing things? Not at all   Feeling down, depressed, or hopeless? Not at all   PHQ-2 Total Score 0   Trouble falling or staying asleep, or sleeping too much? Several days   Feeling tired or having little energy? Over half   Poor appetite or overeating? Not at all   Feeling bad about yourself - or that you are a failure or have let yourself or your family down? Not at all   Trouble concentrating on things, such as reading the newspaper or watching television? Not at all   Moving or speaking so slowly that other people could have noticed? Or the opposite - being so fidgety or restless that you have been moving around a lot more than usual? Not at all   Thoughts that you would be better off dead, or of hurting yourself in some way? Not at all   PHQ-9 Total Score 3   If you checked off any problems, how difficult have these problems made it for you to do your work, take care of things at home, or get along with other people? Not difficult at all        BREE-7  Feeling nervous, anxious or on edge: More than half the days  Not being able to stop or control worrying: Not at all  Worrying too much about different things: Several days  Trouble Relaxing: Not at all  Being so restless that it is hard to sit still: Not at all  Feeling afraid as if something awful might happen: Not at all  Becoming  "easily annoyed or irritable: More than half the days  BREE 7 Total Score: 5  If you checked any problems, how difficult have these problems made it for you to do your work, take care of things at home, or get along with other people: Somewhat difficult      Medications:     Current Outpatient Medications:     amLODIPine (NORVASC) 5 MG tablet, TAKE 1 TABLET BY MOUTH DAILY, Disp: 90 tablet, Rfl: 3    HYDROcodone-acetaminophen (NORCO) 5-325 MG per tablet, Take 1 tablet by mouth Every 6 (Six) Hours As Needed for Severe Pain., Disp: 20 tablet, Rfl: 0    naltrexone-bupropion ER (CONTRAVE) 8-90 MG tablet, Wk 1: 1 tab daily, Wk 2: 1 tab twice a day, Wk 3: 2 tabs in AM, 1 tab in PM, Wk 4: 2 tabs twice a day, Maintenance dose: 2 tabs twice daily., Disp: 120 tablet, Rfl: 0    spironolactone (ALDACTONE) 50 MG tablet, TAKE 1 TABLET BY MOUTH DAILY, Disp: 90 tablet, Rfl: 1    tiZANidine (ZANAFLEX) 4 MG tablet, Take 1 tablet by mouth Every 8 (Eight) Hours As Needed for Muscle Spasms., Disp: 30 tablet, Rfl: 0    Allergies:   Allergies   Allergen Reactions    Motrin [Ibuprofen] Hives     \"Can take ibuprofen but not motrin\"    Sulfa Antibiotics Unknown - Low Severity         Subjective     Mental Status Exam:   MENTAL STATUS EXAM   General Appearance:  Cleanly groomed and dressed  Eye Contact:  Good eye contact  Attitude:  Cooperative  Motor Activity:  Normal gait, posture  Muscle Strength:  Normal  Speech:  Normal rate, tone, volume  Language:  Stereotypical and unspontaneous  Mood and affect:  Euthymic  Thought Process:  Logical and goal-directed  Associations/ Thought Content:  No delusions  Hallucinations:  None  Suicidal Ideations:  Not present  Homicidal Ideation:  Not present  Sensorium:  Alert and clear  Orientation:  Person, place, time and situation  Immediate Recall, Recent, and Remote Memory:  Intact  Attention Span/ Concentration:  Good  Fund of Knowledge:  Appropriate for age and educational level  Intellectual " Functioning:  Average range  Insight:  Good  Judgement:  Good  Reliability:  Good  Impulse Control:  Good       Client's Support Network Includes:  family, best friend, [employer] family    Functional Status: No impairment    Progress toward goal: Not at goal    Prognosis: Good with Ongoing Treatment     Assessment / Plan / Progress     Visit Diagnosis/Orders Placed This Visit:    ICD-10-CM ICD-9-CM   1. Generalized anxiety disorder  F41.1 300.02   2. Post traumatic stress disorder (PTSD)  F43.10 309.81        SUICIDE RISK ASSESSMENT/CSSRS:  1. Does client have thoughts of suicide? Patient denies  2. Does client have intent for suicide? Patient denies  3. Does client have a current plan for suicide? Patient denies   4. History of suicide attempts: Patient denies   5. Family history of suicide or attempts: Patient denies   6. History of violent behaviors towards others or property or thoughts of committing suicide: Patient denies   7. History of sexual aggression toward others: Patient denies   8. Access to firearms or weapons: Yes     PLAN:  Safety: No acute safety concerns  Risk Assessment: Risk of self-harm acutely is low. Risk of self-harm chronically is also low, but could be further elevated in the event of treatment noncompliance and/or AODA.    Presenting Problem: Client reported that things have been good over the past few weeks. She reported that she went to a fireworks show this past weekend and cried when the fireworks were over. She processed this with clinician and connected this as a trigger for PTSD. She discussed how she was forced into motherhood and wanted to serve before having kids. She reported that she wanted to travel and see the world. She agreed with clinician that she is personally unfulfilled because of this in spite of loving her kids. She reported that she has an interview at [employer] this Saturday which she is excited about. She reported that she also got a new car which has eliminated the  stressor of not having a car.     Treatment Plan/Goals & Progress: Continue supportive psychotherapy efforts and medications as indicated. Treatment options discussed during today's visit. Client ackowledged and verbally consented to continue with current treatment plan and was educated on the importance of compliance with treatment and follow-up appointments. Client seems reasonably able to adhere to treatment plan.      Assisted client in processing above session content; acknowledged and normalized client’s thoughts, feelings, and concerns.  Rationalized client's thought process regarding anxiety and PTSD.      Allowed client to freely discuss issues without interruption or judgement with unconditional positive regard, active listening skills, and empathy. Clinician provided a safe, confidential environment to facilitate the development of a positive therapeutic relationship and encouraged open, honest communication. Assisted client in identifying risk factors which would indicate the need for higher level of care including thoughts to harm self or others and/or self-harming behavior and encouraged client to contact this office, call 911, or present to the nearest emergency room should any of these events occur. Discussed crisis intervention services and means to access. Client adamantly and convincingly denies current suicidal or homicidal ideation or perceptual disturbance. Assisted client in processing session content; acknowledged and normalized client’s thoughts, feelings, and concerns by utilizing a person-centered approach in efforts to build appropriate rapport and a positive therapeutic relationship with open and honest communication.       Follow Up:   Return in about 2 weeks (around 7/23/2025) for Next scheduled follow up.    Ambar Walsh LCSW  [facility]

## 2025-07-23 ENCOUNTER — OFFICE VISIT (OUTPATIENT)
Age: 35
End: 2025-07-23
Payer: COMMERCIAL

## 2025-07-23 DIAGNOSIS — F43.10 POST TRAUMATIC STRESS DISORDER (PTSD): ICD-10-CM

## 2025-07-23 DIAGNOSIS — F41.1 GENERALIZED ANXIETY DISORDER: Primary | ICD-10-CM

## 2025-07-23 NOTE — PROGRESS NOTES
[facility]      Follow Up Adult Note     Date:2025   Client Name: Oralia Lawrence  : 1990   MRN: [id number]   Time IN: 9:08 AM    Time OUT: 10:02 AM     Referring Provider: Stephanie Pierce MD    Chief Complaint:      ICD-10-CM ICD-9-CM   1. Generalized anxiety disorder  F41.1 300.02   2. Post traumatic stress disorder (PTSD)  F43.10 309.81        History of Present Illness:   Oralia Lawrence is a 34 y.o. female who is being seen today for follow up individual psychotherapy counseling for anxiety and PTSD.        Objective     PHQ-9 Depression Screening  Little interest or pleasure in doing things? Not at all   Feeling down, depressed, or hopeless? Not at all   PHQ-2 Total Score 0   Trouble falling or staying asleep, or sleeping too much? Several days   Feeling tired or having little energy? Several days   Poor appetite or overeating? Not at all   Feeling bad about yourself - or that you are a failure or have let yourself or your family down? Not at all   Trouble concentrating on things, such as reading the newspaper or watching television? Over half   Moving or speaking so slowly that other people could have noticed? Or the opposite - being so fidgety or restless that you have been moving around a lot more than usual? Not at all   Thoughts that you would be better off dead, or of hurting yourself in some way? Not at all   PHQ-9 Total Score 4   If you checked off any problems, how difficult have these problems made it for you to do your work, take care of things at home, or get along with other people? Not difficult at all        BREE-7  Feeling nervous, anxious or on edge: More than half the days  Not being able to stop or control worrying: Not at all  Worrying too much about different things: More than half the days  Trouble Relaxing: More than half the days  Being so restless that it is hard to sit still: More than half the days  Feeling afraid as if something awful  "might happen: Not at all  Becoming easily annoyed or irritable: More than half the days  BREE 7 Total Score: 10  If you checked any problems, how difficult have these problems made it for you to do your work, take care of things at home, or get along with other people: Somewhat difficult      Medications:     Current Outpatient Medications:     amLODIPine (NORVASC) 5 MG tablet, TAKE 1 TABLET BY MOUTH DAILY, Disp: 90 tablet, Rfl: 3    HYDROcodone-acetaminophen (NORCO) 5-325 MG per tablet, Take 1 tablet by mouth Every 6 (Six) Hours As Needed for Severe Pain., Disp: 20 tablet, Rfl: 0    naltrexone-bupropion ER (CONTRAVE) 8-90 MG tablet, Wk 1: 1 tab daily, Wk 2: 1 tab twice a day, Wk 3: 2 tabs in AM, 1 tab in PM, Wk 4: 2 tabs twice a day, Maintenance dose: 2 tabs twice daily., Disp: 120 tablet, Rfl: 0    spironolactone (ALDACTONE) 50 MG tablet, TAKE 1 TABLET BY MOUTH DAILY, Disp: 90 tablet, Rfl: 1    tiZANidine (ZANAFLEX) 4 MG tablet, Take 1 tablet by mouth Every 8 (Eight) Hours As Needed for Muscle Spasms., Disp: 30 tablet, Rfl: 0    Allergies:   Allergies   Allergen Reactions    Motrin [Ibuprofen] Hives     \"Can take ibuprofen but not motrin\"    Sulfa Antibiotics Unknown - Low Severity         Subjective     Mental Status Exam:   MENTAL STATUS EXAM   General Appearance:  Cleanly groomed and dressed  Eye Contact:  Good eye contact  Attitude:  Cooperative  Motor Activity:  Normal gait, posture  Muscle Strength:  Normal  Speech:  Normal rate, tone, volume  Language:  Stereotypical and unspontaneous  Mood and affect:  Normal, pleasant  Thought Process:  Logical and goal-directed  Associations/ Thought Content:  No delusions  Hallucinations:  None  Suicidal Ideations:  Not present  Homicidal Ideation:  Not present  Sensorium:  Alert and clear  Orientation:  Person, place, time and situation  Immediate Recall, Recent, and Remote Memory:  Intact  Attention Span/ Concentration:  Good  Fund of Knowledge:  Appropriate for age " and educational level  Intellectual Functioning:  Average range  Insight:  Good  Judgement:  Good  Reliability:  Good  Impulse Control:  Good       Client's Support Network Includes:  family, best friend, [employer] family    Functional Status: No impairment    Progress toward goal: Not at goal    Prognosis: Good with Ongoing Treatment     Assessment / Plan / Progress     Visit Diagnosis/Orders Placed This Visit:    ICD-10-CM ICD-9-CM   1. Generalized anxiety disorder  F41.1 300.02   2. Post traumatic stress disorder (PTSD)  F43.10 309.81        SUICIDE RISK ASSESSMENT/CSSRS:  1. Does client have thoughts of suicide? Patient denies  2. Does client have intent for suicide? Patient denies  3. Does client have a current plan for suicide? Patient denies   4. History of suicide attempts: Patient denies   5. Family history of suicide or attempts: Patient denies   6. History of violent behaviors towards others or property or thoughts of committing suicide: Patient denies   7. History of sexual aggression toward others: Patient denies   8. Access to firearms or weapons: Yes     PLAN:  Safety: No acute safety concerns  Risk Assessment: Risk of self-harm acutely is low. Risk of self-harm chronically is also low, but could be further elevated in the event of treatment noncompliance and/or AODA.    Presenting Problem: Client reported that things have been good over the past couple of weeks. She reported that she has been trying to learn her PTSD symptoms. She reported that her neighbor's welcome sign fell which triggered emotions. She reported that people were setting fireworks off 2 weeks after the Fourth of July which also made her emotional. She reported that she had to put headphones on to cope with the fireworks. She reported that she encountered a crowd at [facility] and she became dizzy, wanted to faint, and her eyes got heavy. She reported that she felt overwhelmed, hypervigilant, drained, unsafe, and her daughter had to drive  them home. Clinician suggested that she may have had a panic attack at [facility]. She reported that she starts orientation at [employer] on August 18 and she is feeling excited. She reported that she is worried about getting overwhelmed about crowds at her new job. Clinician discussed techniques to help with this. She reported that her daughter also got her first job at [employer].     Treatment Plan/Goals & Progress: Continue supportive psychotherapy efforts and medications as indicated. Treatment options discussed during today's visit. Client ackowledged and verbally consented to continue with current treatment plan and was educated on the importance of compliance with treatment and follow-up appointments. Client seems reasonably able to adhere to treatment plan.      Assisted client in processing above session content; acknowledged and normalized client’s thoughts, feelings, and concerns.  Rationalized client's thought process regarding anxiety and PTSD.      Allowed client to freely discuss issues without interruption or judgement with unconditional positive regard, active listening skills, and empathy. Clinician provided a safe, confidential environment to facilitate the development of a positive therapeutic relationship and encouraged open, honest communication. Assisted client in identifying risk factors which would indicate the need for higher level of care including thoughts to harm self or others and/or self-harming behavior and encouraged client to contact this office, call 911, or present to the nearest emergency room should any of these events occur. Discussed crisis intervention services and means to access. Client adamantly and convincingly denies current suicidal or homicidal ideation or perceptual disturbance. Assisted client in processing session content; acknowledged and normalized client’s thoughts, feelings, and concerns by utilizing a person-centered approach in efforts to build appropriate rapport  and a positive therapeutic relationship with open and honest communication.       Follow Up:   Return in about 2 weeks (around 8/6/2025) for Next scheduled follow up.    Ambar Walsh LCSW  [facility]

## 2025-08-11 ENCOUNTER — OFFICE VISIT (OUTPATIENT)
Age: 35
End: 2025-08-11
Payer: COMMERCIAL

## 2025-08-11 DIAGNOSIS — F41.1 GENERALIZED ANXIETY DISORDER: Primary | ICD-10-CM

## 2025-08-11 DIAGNOSIS — F43.10 POST TRAUMATIC STRESS DISORDER (PTSD): ICD-10-CM

## 2025-08-11 PROCEDURE — 90837 PSYTX W PT 60 MINUTES: CPT

## 2025-08-28 ENCOUNTER — OFFICE VISIT (OUTPATIENT)
Dept: INTERNAL MEDICINE | Facility: CLINIC | Age: 35
End: 2025-08-28
Payer: COMMERCIAL

## 2025-08-28 VITALS
WEIGHT: 247.8 LBS | BODY MASS INDEX: 38.89 KG/M2 | SYSTOLIC BLOOD PRESSURE: 132 MMHG | OXYGEN SATURATION: 98 % | HEIGHT: 67 IN | TEMPERATURE: 97.8 F | HEART RATE: 88 BPM | DIASTOLIC BLOOD PRESSURE: 98 MMHG | RESPIRATION RATE: 18 BRPM

## 2025-08-28 DIAGNOSIS — E78.00 HYPERCHOLESTEROLEMIA: ICD-10-CM

## 2025-08-28 DIAGNOSIS — F41.1 GENERALIZED ANXIETY DISORDER: ICD-10-CM

## 2025-08-28 DIAGNOSIS — R74.01 ELEVATED ALT MEASUREMENT: ICD-10-CM

## 2025-08-28 DIAGNOSIS — Z00.00 ANNUAL PHYSICAL EXAM: Primary | ICD-10-CM

## 2025-08-28 RX ORDER — BUPROPION HYDROCHLORIDE 150 MG/1
150 TABLET ORAL DAILY
Qty: 90 TABLET | Refills: 1 | Status: SHIPPED | OUTPATIENT
Start: 2025-08-28

## 2025-08-29 ENCOUNTER — TELEPHONE (OUTPATIENT)
Age: 35
End: 2025-08-29
Payer: COMMERCIAL

## (undated) DEVICE — SOL IRR H2O BO 1000ML STRL

## (undated) DEVICE — YANKAUER,BULB TIP,W/O VENT,RIGID,STERILE: Brand: MEDLINE

## (undated) DEVICE — TRAP FLD MINIVAC MEGADYNE 100ML

## (undated) DEVICE — GLV SURG NEOPRN SENSICARE PF SZ/6.5 LF EA/1PR

## (undated) DEVICE — TUBING, SUCTION, 1/4" X 20', STRAIGHT: Brand: MEDLINE INDUSTRIES, INC.

## (undated) DEVICE — SMOKE EVACUATION TUBING WITH 8 IN INTEGRAL WAND AND SPONGE GUARD: Brand: BUFFALO FILTER

## (undated) DEVICE — ANTIBACTERIAL UNDYED BRAIDED (POLYGLACTIN 910), SYNTHETIC ABSORBABLE SUTURE: Brand: COATED VICRYL

## (undated) DEVICE — LAG PERI GYN: Brand: MEDLINE INDUSTRIES, INC.

## (undated) DEVICE — PENCL SMOKE/EVAC MEGADYNE TELESCP 10FT